# Patient Record
Sex: FEMALE | Race: WHITE | NOT HISPANIC OR LATINO | Employment: FULL TIME | URBAN - METROPOLITAN AREA
[De-identification: names, ages, dates, MRNs, and addresses within clinical notes are randomized per-mention and may not be internally consistent; named-entity substitution may affect disease eponyms.]

---

## 2019-06-25 ENCOUNTER — TELEPHONE (OUTPATIENT)
Dept: BARIATRICS | Facility: CLINIC | Age: 29
End: 2019-06-25

## 2019-06-25 NOTE — TELEPHONE ENCOUNTER
----- Message from Adilene Gutierres sent at 6/25/2019  4:45 PM CDT -----  Contact: [facility]  Appointment Request From: Chiqui Macias    With Provider: Brisa Brar    Preferred Date Range: Any date 6/25/2019 or later    Preferred Times: Any time    Reason for visit: Existing Patient    Comments:  Pregnant

## 2019-06-26 ENCOUNTER — TELEPHONE (OUTPATIENT)
Dept: OBSTETRICS AND GYNECOLOGY | Facility: CLINIC | Age: 29
End: 2019-06-26

## 2019-06-26 NOTE — TELEPHONE ENCOUNTER
----- Message from Dee Dee Sorenson sent at 6/26/2019  1:46 PM CDT -----  Contact: pt   Pt requesting a call back to schedule appointment.Please leave a message if unable to speak with her.Pt is over 20 weeks and requesting to see Ms. Belia Givens call back at [phone].      Thanks,  Dee Dee Sorenson

## 2019-06-26 NOTE — TELEPHONE ENCOUNTER
Spoke to patient and scheduled her appointment for 07/02/19 at 10:00am to see CAMMIE Celaya at the [place] location. Patient verbalized understanding.

## 2019-10-26 ENCOUNTER — HOSPITAL ENCOUNTER (INPATIENT)
Facility: HOSPITAL | Age: 29
LOS: 3 days | Discharge: HOME | End: 2019-10-29
Attending: OBSTETRICS & GYNECOLOGY | Admitting: OBSTETRICS & GYNECOLOGY
Payer: MEDICAID

## 2019-10-26 DIAGNOSIS — O34.219 VBAC, DELIVERED: ICD-10-CM

## 2019-10-26 DIAGNOSIS — O34.219 PREVIOUS CESAREAN DELIVERY AFFECTING PREGNANCY: ICD-10-CM

## 2019-10-26 LAB
ABO + RH BLD: NORMAL
ABO + RH BLD: NORMAL
ALBUMIN SERPL BCP-MCNC: 2.5 G/DL (ref 3.5–5.2)
ALP SERPL-CCNC: 180 U/L (ref 55–135)
ALT SERPL W/O P-5'-P-CCNC: 10 U/L (ref 10–44)
AMPHET+METHAMPHET UR QL: NORMAL
ANION GAP SERPL CALC-SCNC: 11 MMOL/L (ref 8–16)
AST SERPL-CCNC: 21 U/L (ref 10–40)
BARBITURATES UR QL SCN>200 NG/ML: NEGATIVE
BASOPHILS # BLD AUTO: 0.04 K/UL (ref 0–0.2)
BASOPHILS NFR BLD: 0.3 % (ref 0–1.9)
BENZODIAZ UR QL SCN>200 NG/ML: NEGATIVE
BILIRUB SERPL-MCNC: 0.3 MG/DL (ref 0.1–1)
BLD GP AB SCN CELLS X3 SERPL QL: NORMAL
BUN SERPL-MCNC: 14 MG/DL (ref 6–20)
BZE UR QL SCN: NEGATIVE
CALCIUM SERPL-MCNC: 8.8 MG/DL (ref 8.7–10.5)
CANNABINOIDS UR QL SCN: NEGATIVE
CHLORIDE SERPL-SCNC: 108 MMOL/L (ref 95–110)
CO2 SERPL-SCNC: 17 MMOL/L (ref 23–29)
CREAT SERPL-MCNC: 0.7 MG/DL (ref 0.5–1.4)
CREAT UR-MCNC: 224.7 MG/DL (ref 15–325)
CREAT UR-MCNC: 224.7 MG/DL (ref 15–325)
DIFFERENTIAL METHOD: ABNORMAL
EOSINOPHIL # BLD AUTO: 0.1 K/UL (ref 0–0.5)
EOSINOPHIL NFR BLD: 0.7 % (ref 0–8)
ERYTHROCYTE [DISTWIDTH] IN BLOOD BY AUTOMATED COUNT: 13.4 % (ref 11.5–14.5)
EST. GFR  (AFRICAN AMERICAN): >60 ML/MIN/1.73 M^2
EST. GFR  (NON AFRICAN AMERICAN): >60 ML/MIN/1.73 M^2
FETAL CELL SCN BLD QL ROSETTE: NORMAL
GLUCOSE SERPL-MCNC: 84 MG/DL (ref 70–110)
HCT VFR BLD AUTO: 42.5 % (ref 37–48.5)
HGB BLD-MCNC: 13.9 G/DL (ref 12–16)
HIV1+2 IGG SERPL QL IA.RAPID: NEGATIVE
IMM GRANULOCYTES # BLD AUTO: 0.09 K/UL (ref 0–0.04)
IMM GRANULOCYTES NFR BLD AUTO: 0.6 % (ref 0–0.5)
LYMPHOCYTES # BLD AUTO: 3.1 K/UL (ref 1–4.8)
LYMPHOCYTES NFR BLD: 21.5 % (ref 18–48)
MCH RBC QN AUTO: 28.5 PG (ref 27–31)
MCHC RBC AUTO-ENTMCNC: 32.7 G/DL (ref 32–36)
MCV RBC AUTO: 87 FL (ref 82–98)
METHADONE UR QL SCN>300 NG/ML: NEGATIVE
MONOCYTES # BLD AUTO: 0.8 K/UL (ref 0.3–1)
MONOCYTES NFR BLD: 5.6 % (ref 4–15)
NEUTROPHILS # BLD AUTO: 10.2 K/UL (ref 1.8–7.7)
NEUTROPHILS NFR BLD: 71.3 % (ref 38–73)
NRBC BLD-RTO: 0 /100 WBC
OPIATES UR QL SCN: NEGATIVE
PCP UR QL SCN>25 NG/ML: NEGATIVE
PLATELET # BLD AUTO: 224 K/UL (ref 150–350)
PMV BLD AUTO: 10.1 FL (ref 9.2–12.9)
POTASSIUM SERPL-SCNC: 3.9 MMOL/L (ref 3.5–5.1)
PROT SERPL-MCNC: 6.4 G/DL (ref 6–8.4)
PROT UR-MCNC: 66 MG/DL (ref 0–15)
PROT/CREAT UR: 0.29 MG/G{CREAT} (ref 0–0.2)
RBC # BLD AUTO: 4.87 M/UL (ref 4–5.4)
RPR SER QL: NORMAL
SODIUM SERPL-SCNC: 136 MMOL/L (ref 136–145)
TOXICOLOGY INFORMATION: NORMAL
WBC # BLD AUTO: 14.36 K/UL (ref 3.9–12.7)

## 2019-10-26 PROCEDURE — 63600175 PHARM REV CODE 636 W HCPCS: Performed by: OBSTETRICS & GYNECOLOGY

## 2019-10-26 PROCEDURE — 85461 HEMOGLOBIN FETAL: CPT

## 2019-10-26 PROCEDURE — 86707 HEPATITIS BE ANTIBODY: CPT

## 2019-10-26 PROCEDURE — 86850 RBC ANTIBODY SCREEN: CPT

## 2019-10-26 PROCEDURE — 86592 SYPHILIS TEST NON-TREP QUAL: CPT

## 2019-10-26 PROCEDURE — 99900059 HC C-SECTION ATTEND (STAT)

## 2019-10-26 PROCEDURE — 85025 COMPLETE CBC W/AUTO DIFF WBC: CPT

## 2019-10-26 PROCEDURE — 25000003 PHARM REV CODE 250: Performed by: MIDWIFE

## 2019-10-26 PROCEDURE — 25000003 PHARM REV CODE 250: Performed by: OBSTETRICS & GYNECOLOGY

## 2019-10-26 PROCEDURE — 86762 RUBELLA ANTIBODY: CPT

## 2019-10-26 PROCEDURE — 86901 BLOOD TYPING SEROLOGIC RH(D): CPT | Mod: 91

## 2019-10-26 PROCEDURE — 80307 DRUG TEST PRSMV CHEM ANLYZR: CPT

## 2019-10-26 PROCEDURE — 72200004 HC VAGINAL DELIVERY LEVEL I

## 2019-10-26 PROCEDURE — 84156 ASSAY OF PROTEIN URINE: CPT

## 2019-10-26 PROCEDURE — 80053 COMPREHEN METABOLIC PANEL: CPT

## 2019-10-26 PROCEDURE — 59612 PR VAG DELIV ONLY,PREV C-SECTN: ICD-10-PCS | Mod: GZ,,, | Performed by: MIDWIFE

## 2019-10-26 PROCEDURE — 51701 INSERT BLADDER CATHETER: CPT

## 2019-10-26 PROCEDURE — 86703 HIV-1/HIV-2 1 RESULT ANTBDY: CPT

## 2019-10-26 PROCEDURE — 11000001 HC ACUTE MED/SURG PRIVATE ROOM

## 2019-10-26 RX ORDER — LABETALOL HYDROCHLORIDE 5 MG/ML
20 INJECTION, SOLUTION INTRAVENOUS ONCE AS NEEDED
Status: COMPLETED | OUTPATIENT
Start: 2019-10-26 | End: 2019-10-26

## 2019-10-26 RX ORDER — LABETALOL HYDROCHLORIDE 5 MG/ML
40 INJECTION, SOLUTION INTRAVENOUS ONCE AS NEEDED
Status: COMPLETED | OUTPATIENT
Start: 2019-10-26 | End: 2019-10-26

## 2019-10-26 RX ORDER — OXYTOCIN/RINGER'S LACTATE 30/500 ML
334 PLASTIC BAG, INJECTION (ML) INTRAVENOUS CONTINUOUS
Status: DISCONTINUED | OUTPATIENT
Start: 2019-10-26 | End: 2019-10-29 | Stop reason: HOSPADM

## 2019-10-26 RX ORDER — DIPHENOXYLATE HYDROCHLORIDE AND ATROPINE SULFATE 2.5; .025 MG/1; MG/1
1 TABLET ORAL ONCE
Status: COMPLETED | OUTPATIENT
Start: 2019-10-26 | End: 2019-10-26

## 2019-10-26 RX ORDER — SODIUM CHLORIDE, SODIUM LACTATE, POTASSIUM CHLORIDE, CALCIUM CHLORIDE 600; 310; 30; 20 MG/100ML; MG/100ML; MG/100ML; MG/100ML
INJECTION, SOLUTION INTRAVENOUS CONTINUOUS
Status: DISCONTINUED | OUTPATIENT
Start: 2019-10-26 | End: 2019-10-26

## 2019-10-26 RX ORDER — DIPHENHYDRAMINE HYDROCHLORIDE 50 MG/ML
25 INJECTION INTRAMUSCULAR; INTRAVENOUS EVERY 4 HOURS PRN
Status: DISCONTINUED | OUTPATIENT
Start: 2019-10-26 | End: 2019-10-29 | Stop reason: HOSPADM

## 2019-10-26 RX ORDER — SODIUM CHLORIDE, SODIUM LACTATE, POTASSIUM CHLORIDE, CALCIUM CHLORIDE 600; 310; 30; 20 MG/100ML; MG/100ML; MG/100ML; MG/100ML
INJECTION, SOLUTION INTRAVENOUS CONTINUOUS
Status: DISCONTINUED | OUTPATIENT
Start: 2019-10-26 | End: 2019-10-29 | Stop reason: HOSPADM

## 2019-10-26 RX ORDER — OXYTOCIN/RINGER'S LACTATE 30/500 ML
334 PLASTIC BAG, INJECTION (ML) INTRAVENOUS ONCE
Status: DISCONTINUED | OUTPATIENT
Start: 2019-10-26 | End: 2019-10-26

## 2019-10-26 RX ORDER — DOCUSATE SODIUM 100 MG/1
200 CAPSULE, LIQUID FILLED ORAL 2 TIMES DAILY PRN
Status: DISCONTINUED | OUTPATIENT
Start: 2019-10-26 | End: 2019-10-29 | Stop reason: HOSPADM

## 2019-10-26 RX ORDER — CALCIUM GLUCONATE 98 MG/ML
1 INJECTION, SOLUTION INTRAVENOUS
Status: DISCONTINUED | OUTPATIENT
Start: 2019-10-26 | End: 2019-10-29 | Stop reason: HOSPADM

## 2019-10-26 RX ORDER — HYDRALAZINE HYDROCHLORIDE 20 MG/ML
10 INJECTION INTRAMUSCULAR; INTRAVENOUS ONCE AS NEEDED
Status: DISCONTINUED | OUTPATIENT
Start: 2019-10-26 | End: 2019-10-29 | Stop reason: HOSPADM

## 2019-10-26 RX ORDER — CEFAZOLIN SODIUM 2 G/50ML
2 SOLUTION INTRAVENOUS ONCE AS NEEDED
Status: DISCONTINUED | OUTPATIENT
Start: 2019-10-26 | End: 2019-10-26

## 2019-10-26 RX ORDER — CHLORHEXIDINE GLUCONATE ORAL RINSE 1.2 MG/ML
10 SOLUTION DENTAL
Status: CANCELLED | OUTPATIENT
Start: 2019-10-26

## 2019-10-26 RX ORDER — MISOPROSTOL 200 UG/1
800 TABLET ORAL
Status: DISCONTINUED | OUTPATIENT
Start: 2019-10-26 | End: 2019-10-26

## 2019-10-26 RX ORDER — MAGNESIUM SULFATE HEPTAHYDRATE 40 MG/ML
2 INJECTION, SOLUTION INTRAVENOUS CONTINUOUS
Status: DISCONTINUED | OUTPATIENT
Start: 2019-10-26 | End: 2019-10-29 | Stop reason: HOSPADM

## 2019-10-26 RX ORDER — ACETAMINOPHEN 325 MG/1
650 TABLET ORAL EVERY 6 HOURS PRN
Status: DISCONTINUED | OUTPATIENT
Start: 2019-10-26 | End: 2019-10-29 | Stop reason: HOSPADM

## 2019-10-26 RX ORDER — LABETALOL HYDROCHLORIDE 5 MG/ML
80 INJECTION, SOLUTION INTRAVENOUS ONCE AS NEEDED
Status: DISCONTINUED | OUTPATIENT
Start: 2019-10-26 | End: 2019-10-29 | Stop reason: HOSPADM

## 2019-10-26 RX ORDER — HYDROCODONE BITARTRATE AND ACETAMINOPHEN 5; 325 MG/1; MG/1
1 TABLET ORAL EVERY 4 HOURS PRN
Status: DISCONTINUED | OUTPATIENT
Start: 2019-10-26 | End: 2019-10-29 | Stop reason: HOSPADM

## 2019-10-26 RX ORDER — IBUPROFEN 600 MG/1
600 TABLET ORAL EVERY 6 HOURS
Status: DISCONTINUED | OUTPATIENT
Start: 2019-10-26 | End: 2019-10-26

## 2019-10-26 RX ORDER — SODIUM CITRATE AND CITRIC ACID MONOHYDRATE 334; 500 MG/5ML; MG/5ML
30 SOLUTION ORAL
Status: DISCONTINUED | OUTPATIENT
Start: 2019-10-26 | End: 2019-10-26

## 2019-10-26 RX ORDER — OXYTOCIN/RINGER'S LACTATE 30/500 ML
95 PLASTIC BAG, INJECTION (ML) INTRAVENOUS ONCE
Status: DISCONTINUED | OUTPATIENT
Start: 2019-10-26 | End: 2019-10-26

## 2019-10-26 RX ORDER — MAGNESIUM SULFATE HEPTAHYDRATE 40 MG/ML
4 INJECTION, SOLUTION INTRAVENOUS ONCE
Status: COMPLETED | OUTPATIENT
Start: 2019-10-26 | End: 2019-10-26

## 2019-10-26 RX ORDER — CARBOPROST TROMETHAMINE 250 UG/ML
250 INJECTION, SOLUTION INTRAMUSCULAR ONCE
Status: COMPLETED | OUTPATIENT
Start: 2019-10-26 | End: 2019-10-26

## 2019-10-26 RX ORDER — ONDANSETRON 8 MG/1
8 TABLET, ORALLY DISINTEGRATING ORAL EVERY 8 HOURS PRN
Status: DISCONTINUED | OUTPATIENT
Start: 2019-10-26 | End: 2019-10-29 | Stop reason: HOSPADM

## 2019-10-26 RX ORDER — DIPHENHYDRAMINE HCL 25 MG
25 CAPSULE ORAL EVERY 4 HOURS PRN
Status: DISCONTINUED | OUTPATIENT
Start: 2019-10-26 | End: 2019-10-29 | Stop reason: HOSPADM

## 2019-10-26 RX ORDER — LABETALOL HYDROCHLORIDE 5 MG/ML
40 INJECTION, SOLUTION INTRAVENOUS ONCE AS NEEDED
Status: DISCONTINUED | OUTPATIENT
Start: 2019-10-26 | End: 2019-10-29 | Stop reason: HOSPADM

## 2019-10-26 RX ORDER — OXYTOCIN/RINGER'S LACTATE 30/500 ML
95 PLASTIC BAG, INJECTION (ML) INTRAVENOUS ONCE
Status: DISCONTINUED | OUTPATIENT
Start: 2019-10-26 | End: 2019-10-29 | Stop reason: HOSPADM

## 2019-10-26 RX ADMIN — MAGNESIUM SULFATE HEPTAHYDRATE 2 G/HR: 40 INJECTION, SOLUTION INTRAVENOUS at 09:10

## 2019-10-26 RX ADMIN — LABETALOL HYDROCHLORIDE 20 MG: 5 INJECTION INTRAVENOUS at 06:10

## 2019-10-26 RX ADMIN — HYDROCODONE BITARTRATE AND ACETAMINOPHEN 1 TABLET: 5; 325 TABLET ORAL at 04:10

## 2019-10-26 RX ADMIN — LABETALOL HYDROCHLORIDE 20 MG: 5 INJECTION INTRAVENOUS at 05:10

## 2019-10-26 RX ADMIN — CARBOPROST TROMETHAMINE 250 MCG: 250 INJECTION, SOLUTION INTRAMUSCULAR at 04:10

## 2019-10-26 RX ADMIN — SODIUM CHLORIDE, SODIUM LACTATE, POTASSIUM CHLORIDE, AND CALCIUM CHLORIDE: .6; .31; .03; .02 INJECTION, SOLUTION INTRAVENOUS at 08:10

## 2019-10-26 RX ADMIN — LABETALOL HYDROCHLORIDE 20 MG: 5 INJECTION INTRAVENOUS at 07:10

## 2019-10-26 RX ADMIN — Medication 334 MILLI-UNITS/MIN: at 04:10

## 2019-10-26 RX ADMIN — DIPHENOXYLATE HYDROCHLORIDE AND ATROPINE SULFATE 1 TABLET: 2.5; .025 TABLET ORAL at 04:10

## 2019-10-26 RX ADMIN — IBUPROFEN 600 MG: 600 TABLET, FILM COATED ORAL at 03:10

## 2019-10-26 RX ADMIN — MAGNESIUM SULFATE HEPTAHYDRATE 4 G: 40 INJECTION, SOLUTION INTRAVENOUS at 08:10

## 2019-10-26 NOTE — PROGRESS NOTES
4:03 PM called to evaluate recent excess bleeding with clots. Total EBL since (and including) delivery, was 400mL. Discussed manual exam w pt - she agreed. With this exam the c/s scar was palpable and noted to be intact. Lower uterine segment noted to be atonic. No retained products were appreciated. Pt was able to tolerate the exam. With massage, the uterus responded with good tonicity. Several small clots about  2x5mm size were removed with about 30cc. No active bleeding noted to follow. Made plan to administer additional bag of pitocin, and hemabate. (will abstain from methergine as blood pressure were elevated, however improved now at 150s/70s, PCR nl, No headache, vision change, or right upper quadrant pain).

## 2019-10-26 NOTE — PROGRESS NOTES
Pt called out to report elevated BP; pt was sitting up in bed; BP cuff readjusted and BP retaken; still elevated.  Labetalol given per protocol.

## 2019-10-26 NOTE — SUBJECTIVE & OBJECTIVE
Obstetric HPI:  Patient reports contractions since 0930 am, no prenatal care, reports LMP 19 gives CHANDAN 10/26/19. 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 Reports history of hypothyroidism-not currently taking meds. Reports history of hypertension requiring meds following last pregnancy and not taking medication post discharge from hospital, active fetal movement, No vaginal bleeding , No loss of fluid     This pregnancy has been complicated by no prenatal care, drug use and hypothyroidism    OB History    Para Term  AB Living   5 3 2 1 1 2   SAB TAB Ectopic Multiple Live Births   1 0 0 0 3      # Outcome Date GA Lbr Gume/2nd Weight Sex Delivery Anes PTL Lv   5 Current            4 Term 16 39w3d  3.12 kg (6 lb 14.1 oz) F CS-LTranv Gen, EPI N BETTINA      Name: VINICIO, GIRL JUSTIN      Apgar1: 8  Apgar5: 9   3  10/06/15 36w2d  2.08 kg (4 lb 9.4 oz) F CS-LTranv EPI N ND      Apgar1: 7  Apgar5: 9   2 Term 09 40w0d  3.087 kg (6 lb 12.9 oz) M Vag-Spont EPI N BETTINA   1 SAB 08 4w0d            Past Medical History:   Diagnosis Date    Environmental allergies     Thyroid disease     hypothyroidism     Past Surgical History:   Procedure Laterality Date     SECTION      x 2    TONSILLECTOMY, ADENOIDECTOMY         PTA Medications   Medication Sig    amoxicillin (AMOXIL) 500 MG capsule Take 500 mg by mouth 3 (three) times daily.    ibuprofen (ADVIL,MOTRIN) 800 MG tablet Take 800 mg by mouth 3 (three) times daily.    loratadine (CLARITIN) 10 mg tablet Take 10 mg by mouth once daily.       Review of patient's allergies indicates:  No Known Allergies     Family History     None        Tobacco Use    Smoking status: Current Every Day Smoker     Packs/day: 0.50     Years: 10.00     Pack years: 5.00     Types: Cigarettes   Substance and Sexual Activity    Alcohol use: No     Alcohol/week: 0.0 standard drinks    Drug use: Yes     Frequency: 3.0 times per week      Types: Marijuana     Comment: stopped 2weeks ago     Sexual activity: Yes     Partners: Male     Birth control/protection: Condom     Comment: mut monog     Review of Systems   Constitutional: Negative for activity change, appetite change and fever.   Eyes: Negative for visual disturbance.   Respiratory: Negative for cough.    Cardiovascular: Negative for chest pain.   Gastrointestinal: Negative for nausea and vomiting.   Musculoskeletal: Negative for back pain.   Neurological: Negative for vertigo, syncope and headaches.   Psychiatric/Behavioral: Negative for depression. The patient is not nervous/anxious.       Objective:     Vital Signs (Most Recent):    Vital Signs (24h Range):  BP: ()/()   Arterial Line BP: ()/()         There is no height or weight on file to calculate BMI.    FHT: 140Cat 1 (reassuring)  TOCO:  Q 2-3 minutes    Physical Exam:   Constitutional: She is oriented to person, place, and time. She appears well-developed and well-nourished.    HENT:   Head: Normocephalic and atraumatic.    Eyes: EOM are normal.    Neck: Normal range of motion. Neck supple.    Cardiovascular: Normal rate, regular rhythm, normal heart sounds and intact distal pulses.     Pulmonary/Chest: Effort normal and breath sounds normal.        Abdominal: Soft. Bowel sounds are normal.   gravid             Musculoskeletal: Normal range of motion and moves all extremeties.       Neurological: She is alert and oriented to person, place, and time. She has normal reflexes.    Skin: Skin is warm and dry.    Psychiatric: She has a normal mood and affect. Her behavior is normal. Judgment and thought content normal.       Cervix:  Dilation:  10  Effacement:  100%  Station: 2  Presentation: Vertex     Significant Labs:  Lab Results   Component Value Date    GROUPTRH O NEG 11/01/2016    HEPBSAG Negative 05/06/2016    STREPBCULT No Group B Streptococcus isolated 10/06/2016       I have personallly reviewed all pertinent lab results from the  last 24 hours.

## 2019-10-26 NOTE — PROGRESS NOTES
10/26/2019 5:25 PM   Vaginal bleeding scant. Fundus firm. Still no preeclampsia signs or symptoms, and examination normal with DTRs 1+ all extremities, no clonus,  but Blood pressure is now 180/100. Labetalol protocol ordered.

## 2019-10-26 NOTE — PROGRESS NOTES
CNM at bedside to assess bleeding; manual vag exam preformed; several clots expressed. CNM will call MD for further evaluation.

## 2019-10-26 NOTE — HOSPITAL COURSE
LMP 19- 40w, presents in labor, no PNC  Previous c/s x 2, 1st del- vaginal, 2nd and 3rd c/s  SVE 8cm, Dr Martinez called  Preparing for   1205- screaming she needs to push, SVE complete with head , SROM thick meconium, NICU called  1209 Vaginal delivery   1239- Cytotec given po, Dr Martinez called   1249 Placenta delivered  4:03 PM called to evaluate recent excess bleeding with clots. Total EBL since (and including) delivery, was 400mL. Discussed manual exam w pt - she agreed. With this exam the c/s scar was palpable and noted to be intact. Lower uterine segment noted to be atonic. No retained products were appreciated. Pt was able to tolerate the exam. With massage, the uterus responded with good tonicity. Several small clots about  2x5mm size were removed with about 30cc. No active bleeding noted to follow. Made plan to administer additional bag of pitocin, and hemabate. (will abstain from methergine as blood pressure were elevated, however improved now at 150s/70s, PCR nl, No headache, vision change, or right upper quadrant pain).     10/26/2019 5:25 PM   Vaginal bleeding scant. Fundus firm. Still no preeclampsia signs or symptoms, and examination normal with DTRs 1+ all extremities, no clonus,  but Blood pressure is now 180/100. Labetalol protocol ordered.     10/26/2019 6:04 PM   Repeat blood pressures 150/80s after 20mg IV labetalol.   no complaints. Blood pressures suspected to be reaction to amphetamines, but will keep low threshold for preeclampsia seizure prophylaxis.     10/27/2019 1:21 PM   Patient doing well on magnesium sulphate prophylaxis. She has required another  dose IV labetatlol, therefore started oral regimen of 200mg BID labetalol.  No headache, vision change, or right upper quadrant pain.   10/28/19 0830 on labetalol 200 mg bid, doing well  10/29/19 ppd3 Discharge home today. Reviewed PIH s/s, PPD, bleeding precautions, pain management. Follow up in 1 week for bp  check. 4 weeks postpartum visit. Continue labetalol 200mg BID. Denies neuro symptoms

## 2019-10-26 NOTE — PROGRESS NOTES
Delivery imminent; pt pushing; MD notified but not in house yet; okay to go forward with vag delivery; CNM at bedside.

## 2019-10-26 NOTE — PROGRESS NOTES
MD informed of elevated BP; labetalol order set placed.  MD at bedside for exam; +1 reflexes, denies headache and epigastric pain.

## 2019-10-26 NOTE — H&P
[facility] -   Obstetrics  History & Physical    Patient Name: Chiqui Mooney  MRN: [id number]  Admission Date: 10/26/2019  Primary Care Provider: Primary Doctor No    Subjective:     Principal Problem:Previous  delivery affecting pregnancy    History of Present Illness:   28 yo presents via ambulance in labor    Obstetric HPI:  Patient reports contractions since 0930 am, no prenatal care, reports LMP 19 gives CHANDAN 10/26/19. 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 Reports history of hypothyroidism-not currently taking meds. Reports history of hypertension requiring meds following last pregnancy and not taking medication post discharge from hospital, active fetal movement, No vaginal bleeding , No loss of fluid     This pregnancy has been complicated by no prenatal care, drug use and hypothyroidism    OB History    Para Term  AB Living   5 3 2 1 1 2   SAB TAB Ectopic Multiple Live Births   1 0 0 0 3      # Outcome Date GA Lbr Gume/2nd Weight Sex Delivery Anes PTL Lv   5 Current            4 Term 16 39w3d  3.12 kg (6 lb 14.1 oz) F CS-LTranv Gen, EPI N BETTINA      Name: TJ MOONEY      Apgar1: 8  Apgar5: 9   3  10/06/15 36w2d  2.08 kg (4 lb 9.4 oz) F CS-LTranv EPI N ND      Apgar1: 7  Apgar5: 9   2 Term 09 40w0d  3.087 kg (6 lb 12.9 oz) M Vag-Spont EPI N BETTINA   1 SAB 08 4w0d            Past Medical History:   Diagnosis Date    Environmental allergies     Thyroid disease     hypothyroidism     Past Surgical History:   Procedure Laterality Date     SECTION      x 2    TONSILLECTOMY, ADENOIDECTOMY         PTA Medications   Medication Sig    amoxicillin (AMOXIL) 500 MG capsule Take 500 mg by mouth 3 (three) times daily.    ibuprofen (ADVIL,MOTRIN) 800 MG tablet Take 800 mg by mouth 3 (three) times daily.    loratadine (CLARITIN) 10 mg tablet Take 10 mg by mouth once daily.       Review of patient's allergies  indicates:  No Known Allergies     Family History     None        Tobacco Use    Smoking status: Current Every Day Smoker     Packs/day: 0.50     Years: 10.00     Pack years: 5.00     Types: Cigarettes   Substance and Sexual Activity    Alcohol use: No     Alcohol/week: 0.0 standard drinks    Drug use: Yes     Frequency: 3.0 times per week     Types: Marijuana     Comment: stopped 2weeks ago     Sexual activity: Yes     Partners: Male     Birth control/protection: Condom     Comment: mut monog     Review of Systems   Constitutional: Negative for activity change, appetite change and fever.   Eyes: Negative for visual disturbance.   Respiratory: Negative for cough.    Cardiovascular: Negative for chest pain.   Gastrointestinal: Negative for nausea and vomiting.   Musculoskeletal: Negative for back pain.   Neurological: Negative for vertigo, syncope and headaches.   Psychiatric/Behavioral: Negative for depression. The patient is not nervous/anxious.       Objective:     Vital Signs (Most Recent):    Vital Signs (24h Range):  BP: ()/()   Arterial Line BP: ()/()         There is no height or weight on file to calculate BMI.    FHT: 140Cat 1 (reassuring)  TOCO:  Q 2-3 minutes    Physical Exam:   Constitutional: She is oriented to person, place, and time. She appears well-developed and well-nourished.    HENT:   Head: Normocephalic and atraumatic.    Eyes: EOM are normal.    Neck: Normal range of motion. Neck supple.    Cardiovascular: Normal rate, regular rhythm, normal heart sounds and intact distal pulses.     Pulmonary/Chest: Effort normal and breath sounds normal.        Abdominal: Soft. Bowel sounds are normal.   gravid             Musculoskeletal: Normal range of motion and moves all extremeties.       Neurological: She is alert and oriented to person, place, and time. She has normal reflexes.    Skin: Skin is warm and dry.    Psychiatric: She has a normal mood and affect. Her behavior is normal. Judgment and  thought content normal.       Cervix:  Dilation:  10  Effacement:  100%  Station: 2  Presentation: Vertex     Significant Labs:  Lab Results   Component Value Date    GROUPTRH O NEG 2016    HEPBSAG Negative 2016    STREPBCULT No Group B Streptococcus isolated 10/06/2016       I have personallly reviewed all pertinent lab results from the last 24 hours.    Assessment/Plan:     29 y.o. female  at Unknown for:    * Previous  delivery affecting pregnancy   LMP 19- 40w, presents in labor, no PNC  Previous c/s x 2, 1st del- vaginal, 2nd and 3rd c/s  SVE 8cm, Dr Martinez called  Preparing for   1205- screaming she needs to push, SVE complete with head , SROM thick meconium, NICU called  1209 Vaginal delivery   1239- Cytotec given po, Dr Martinez called   1249 Placenta delivered        Etta Winkler, CAMMIE  Obstetrics  [facility]

## 2019-10-26 NOTE — PROGRESS NOTES
Pt arrived via EMS; CNM at bedside, pt 8 cm.  Previous c/s x2; MD notified by CNM; on her way to hospital.  Will prepare pt for immediate c/s.

## 2019-10-26 NOTE — ASSESSMENT & PLAN NOTE
LMP 19- 40w, presents in labor, no PNC  Previous c/s x 2, 1st del- vaginal, 2nd and 3rd c/s  SVE 8cm, Dr Martinez called  Preparing for   1205- screaming she needs to push, SVE complete with head , SROM thick meconium, NICU called  1209 Vaginal delivery   1239- Cytotec given po, Dr Martinez called   1249 Placenta delivered

## 2019-10-27 PROCEDURE — 25000003 PHARM REV CODE 250: Performed by: OBSTETRICS & GYNECOLOGY

## 2019-10-27 PROCEDURE — 25000003 PHARM REV CODE 250: Performed by: MIDWIFE

## 2019-10-27 PROCEDURE — 25000003 PHARM REV CODE 250: Performed by: ADVANCED PRACTICE MIDWIFE

## 2019-10-27 PROCEDURE — 11000001 HC ACUTE MED/SURG PRIVATE ROOM

## 2019-10-27 PROCEDURE — 63600175 PHARM REV CODE 636 W HCPCS: Performed by: OBSTETRICS & GYNECOLOGY

## 2019-10-27 RX ORDER — LABETALOL 200 MG/1
200 TABLET, FILM COATED ORAL EVERY 12 HOURS
Status: DISCONTINUED | OUTPATIENT
Start: 2019-10-27 | End: 2019-10-29 | Stop reason: HOSPADM

## 2019-10-27 RX ORDER — LABETALOL HYDROCHLORIDE 5 MG/ML
20 INJECTION, SOLUTION INTRAVENOUS ONCE AS NEEDED
Status: DISCONTINUED | OUTPATIENT
Start: 2019-10-27 | End: 2019-10-29 | Stop reason: HOSPADM

## 2019-10-27 RX ORDER — LABETALOL HYDROCHLORIDE 5 MG/ML
20 INJECTION, SOLUTION INTRAVENOUS ONCE AS NEEDED
Status: COMPLETED | OUTPATIENT
Start: 2019-10-27 | End: 2019-10-27

## 2019-10-27 RX ADMIN — LABETALOL HYDROCHLORIDE 200 MG: 200 TABLET, FILM COATED ORAL at 09:10

## 2019-10-27 RX ADMIN — SODIUM CHLORIDE, SODIUM LACTATE, POTASSIUM CHLORIDE, AND CALCIUM CHLORIDE: .6; .31; .03; .02 INJECTION, SOLUTION INTRAVENOUS at 09:10

## 2019-10-27 RX ADMIN — HYDROCODONE BITARTRATE AND ACETAMINOPHEN 1 TABLET: 5; 325 TABLET ORAL at 09:10

## 2019-10-27 RX ADMIN — ACETAMINOPHEN 650 MG: 325 TABLET ORAL at 03:10

## 2019-10-27 RX ADMIN — MAGNESIUM SULFATE HEPTAHYDRATE 2 G/HR: 40 INJECTION, SOLUTION INTRAVENOUS at 02:10

## 2019-10-27 RX ADMIN — ACETAMINOPHEN 650 MG: 325 TABLET ORAL at 09:10

## 2019-10-27 RX ADMIN — LABETALOL HYDROCHLORIDE 20 MG: 5 INJECTION INTRAVENOUS at 05:10

## 2019-10-27 NOTE — ASSESSMENT & PLAN NOTE
HTN management with labetalol 200mg BID  PP seizure prophylaxis with 24hr magnesium sulfate administered.

## 2019-10-27 NOTE — PROGRESS NOTES
[facility] -   Obstetrics  Postpartum Progress Note    Patient Name: Chiqui Macias  MRN: [id number]  Admission Date: 10/26/2019  Hospital Length of Stay: 1 days  Attending Physician: Diana Martinez MD  Primary Care Provider: Primary Doctor No    Subjective:     Principal Problem:, delivered    Hospital course:  LMP 19- 40w, presents in labor, no PNC  Previous c/s x 2, 1st del- vaginal, 2nd and 3rd c/s  SVE 8cm, Dr Martinez called  Preparing for   1205- screaming she needs to push, SVE complete with head , SROM thick meconium, NICU called  1209 Vaginal delivery   1239- Cytotec given po, Dr Martinez called   1249 Placenta delivered  4:03 PM called to evaluate recent excess bleeding with clots. Total EBL since (and including) delivery, was 400mL. Discussed manual exam w pt - she agreed. With this exam the c/s scar was palpable and noted to be intact. Lower uterine segment noted to be atonic. No retained products were appreciated. Pt was able to tolerate the exam. With massage, the uterus responded with good tonicity. Several small clots about  2x5mm size were removed with about 30cc. No active bleeding noted to follow. Made plan to administer additional bag of pitocin, and hemabate. (will abstain from methergine as blood pressure were elevated, however improved now at 150s/70s, PCR nl, No headache, vision change, or right upper quadrant pain).     10/26/2019 5:25 PM   Vaginal bleeding scant. Fundus firm. Still no preeclampsia signs or symptoms, and examination normal with DTRs 1+ all extremities, no clonus,  but Blood pressure is now 180/100. Labetalol protocol ordered.     10/26/2019 6:04 PM   Repeat blood pressures 150/80s after 20mg IV labetalol.   no complaints. Blood pressures suspected to be reaction to amphetamines, but will keep low threshold for preeclampsia seizure prophylaxis.     10/27/2019 1:21 PM   Patient doing well on magnesium sulphate prophylaxis. She  has required another  dose IV labetatlol, therefore started oral regimen of 200mg BID labetalol.  No headache, vision change, or right upper quadrant pain.          Objective:     Vital Signs (Most Recent):  Temp: 97.7 °F (36.5 °C) (10/27/19 0705)  Pulse: 77 (10/27/19 1209)  Resp: 18 (10/27/19 0705)  BP: 125/85 (10/27/19 1204)  SpO2: 97 % (10/27/19 1209) Vital Signs (24h Range):  Temp:  [97.7 °F (36.5 °C)] 97.7 °F (36.5 °C)  Pulse:  [54-99] 77  Resp:  [18] 18  SpO2:  [97 %-100 %] 97 %  BP: (107-192)/() 125/85        There is no height or weight on file to calculate BMI.      Intake/Output Summary (Last 24 hours) at 10/27/2019 1323  Last data filed at 10/27/2019 1200  Gross per 24 hour   Intake 1400 ml   Output 4800 ml   Net -3400 ml       Significant Labs:  Lab Results   Component Value Date    GROUPTRH O NEG 10/26/2019    HEPBSAG Negative 2016    STREPBCULT No Group B Streptococcus isolated 10/06/2016     Recent Labs   Lab 10/26/19  1200   HGB 13.9   HCT 42.5       I have personallly reviewed all pertinent lab results from the last 24 hours.    Physical Exam:   Constitutional: She is oriented to person, place, and time. She appears well-developed and well-nourished.    HENT:   Head: Normocephalic.     Neck: Normal range of motion. No thyromegaly present.     Pulmonary/Chest: Effort normal.        Abdominal: Soft. She exhibits no distension. There is no tenderness (uterine fundus firm below umbilicus and appropriately tender for post delivery).         Genitourinary:   Genitourinary Comments: Lochia within normal           Musculoskeletal: Normal range of motion and moves all extremeties.   No calf tenderness or Kate's sign.       Neurological: She is alert and oriented to person, place, and time. She has normal reflexes.   No clonus    Skin: Skin is warm and dry.    Psychiatric: She has a normal mood and affect.       Assessment/Plan:     29 y.o. female  for:    * , delivered        Hypertension in pregnancy, preeclampsia, delivered  HTN management with labetalol 200mg BID  PP seizure prophylaxis with 24hr magnesium sulfate administered.        Disposition: As patient meets milestones, will plan to discharge  .    Diana Martinez MD  Obstetrics  [facility]

## 2019-10-27 NOTE — SUBJECTIVE & OBJECTIVE
Hospital course:  LMP 19- 40w, presents in labor, no PNC  Previous c/s x 2, 1st del- vaginal, 2nd and 3rd c/s  SVE 8cm, Dr Martinez called  Preparing for   1205- screaming she needs to push, SVE complete with head , SROM thick meconium, NICU called  1209 Vaginal delivery   1239- Cytotec given po, Dr Martinez called   1249 Placenta delivered  4:03 PM called to evaluate recent excess bleeding with clots. Total EBL since (and including) delivery, was 400mL. Discussed manual exam w pt - she agreed. With this exam the c/s scar was palpable and noted to be intact. Lower uterine segment noted to be atonic. No retained products were appreciated. Pt was able to tolerate the exam. With massage, the uterus responded with good tonicity. Several small clots about  2x5mm size were removed with about 30cc. No active bleeding noted to follow. Made plan to administer additional bag of pitocin, and hemabate. (will abstain from methergine as blood pressure were elevated, however improved now at 150s/70s, PCR nl, No headache, vision change, or right upper quadrant pain).     10/26/2019 5:25 PM   Vaginal bleeding scant. Fundus firm. Still no preeclampsia signs or symptoms, and examination normal with DTRs 1+ all extremities, no clonus,  but Blood pressure is now 180/100. Labetalol protocol ordered.     10/26/2019 6:04 PM   Repeat blood pressures 150/80s after 20mg IV labetalol.   no complaints. Blood pressures suspected to be reaction to amphetamines, but will keep low threshold for preeclampsia seizure prophylaxis.     10/27/2019 1:21 PM   Patient doing well on magnesium sulphate prophylaxis. She has required another  dose IV labetatlol, therefore started oral regimen of 200mg BID labetalol.  No headache, vision change, or right upper quadrant pain.          Objective:     Vital Signs (Most Recent):  Temp: 97.7 °F (36.5 °C) (10/27/19 0705)  Pulse: 77 (10/27/19 1209)  Resp: 18 (10/27/19 0705)  BP: 125/85 (10/27/19  1204)  SpO2: 97 % (10/27/19 1209) Vital Signs (24h Range):  Temp:  [97.7 °F (36.5 °C)] 97.7 °F (36.5 °C)  Pulse:  [54-99] 77  Resp:  [18] 18  SpO2:  [97 %-100 %] 97 %  BP: (107-192)/() 125/85        There is no height or weight on file to calculate BMI.      Intake/Output Summary (Last 24 hours) at 10/27/2019 1323  Last data filed at 10/27/2019 1200  Gross per 24 hour   Intake 1400 ml   Output 4800 ml   Net -3400 ml       Significant Labs:  Lab Results   Component Value Date    GROUPTRH O NEG 10/26/2019    HEPBSAG Negative 05/06/2016    STREPBCULT No Group B Streptococcus isolated 10/06/2016     Recent Labs   Lab 10/26/19  1200   HGB 13.9   HCT 42.5       I have personallly reviewed all pertinent lab results from the last 24 hours.    Physical Exam:   Constitutional: She is oriented to person, place, and time. She appears well-developed and well-nourished.    HENT:   Head: Normocephalic.     Neck: Normal range of motion. No thyromegaly present.     Pulmonary/Chest: Effort normal.        Abdominal: Soft. She exhibits no distension. There is no tenderness (uterine fundus firm below umbilicus and appropriately tender for post delivery).         Genitourinary:   Genitourinary Comments: Lochia within normal           Musculoskeletal: Normal range of motion and moves all extremeties.   No calf tenderness or Kate's sign.       Neurological: She is alert and oriented to person, place, and time. She has normal reflexes.   No clonus    Skin: Skin is warm and dry.    Psychiatric: She has a normal mood and affect.

## 2019-10-27 NOTE — L&D DELIVERY NOTE
[facility] -   Vaginal Delivery   Operative Note    SUMMARY     Normal spontaneous vaginal delivery of live infant, skin to skin was unable to be performed due to meconium.  Infant delivered position OA over intact perineum.  Nuchal cord: Yes, cord reduced following delivery.    Spontaneous delivery of placenta and IV pitocin given noting good uterine tone.  No lacerations noted.  Patient tolerated delivery well. Sponge needle and lap counted correctly x2.    Indications: , delivered  Pregnancy complicated by:   Patient Active Problem List   Diagnosis    Tobacco smoking affecting pregnancy    Rh negative status during pregnancy in third trimester    Unexpected sudden death of infant    , delivered    Single live birth     Admitting GA: Unknown    Delivery Information for Lilli Macias    Birth information:  YOB: 2021   Time of birth: 12:09 PM   Sex: female   Head Delivery Date/Time: 10/26/2019 12:09 PM   Delivery type: , Spontaneous   Gestational Age: <None>    Delivery Providers    Delivering clinician:  Etta Winkler CNM   Provider Role    Manda Chang RN Registered Nurse    Milly Bowie RN Registered Nurse    BECKIE Grullon Nurse Practitioner    Briana Norris RN Registered Nurse            Measurements    Weight:  2925 g  Length:  49 cm  Head circumference:  33.5 cm  Chest circumference:  31.5 cm  Abdominal girth:  31 cm         Apgars    Living status:  Living  Apgars:   1 min.:   5 min.:   10 min.:   15 min.:   20 min.:     Skin color:   0  1       Heart rate:   2  2       Reflex irritability:   2  2       Muscle tone:   2  2       Respiratory effort:   2  2       Total:   8  9       Apgars assigned by:  BECKIE VERDE         Operative Delivery    Forceps attempted?:  No  Vacuum extractor attempted?:  No         Shoulder Dystocia    Shoulder dystocia present?:  No           Presentation    Presentation:  Vertex  Position:  Middle  Occiput Anterior           Interventions/Resuscitation    Method:  Bulb Suctioning, Tactile Stimulation, Deep Suctioning, Blow By Oxygen, CPAP, NICU Attended       Cord    Vessels:  3 vessels  Complications:  None  Delayed Cord Clamping?:  No  Cord Clamped Date/Time:  10/26/2019 12:09 PM  Cord Blood Disposition:  Lab  Gases Sent?:  No  Stem Cell Collection (by MD):  No       Placenta    Placenta delivery date/time:  10/26/2019 1249  Placenta removal:  Spontaneous  Placenta appearance:  Intact  Placenta disposition:  discarded           Labor Events:       labor: No     Labor Onset Date/Time:         Dilation Complete Date/Time:         Start Pushing Date/Time:       Rupture Date/Time:              Rupture type:           Fluid Amount:        Fluid Color:        Fluid Odor:        Membrane Status (PeriCalm): INT (Intact)      Rupture Date/Time (PeriCalm):        Fluid Amount (PeriCalm):        Fluid Color (PeriCalm):         steroids: None     Antibiotics given for GBS: No     Induction: none     Indications for induction:        Augmentation:       Indications for augmentation:       Labor complications: None     Additional complications:          Cervical ripening:                     Delivery:      Episiotomy: None     Indication for Episiotomy:       Perineal Lacerations: None Repaired:      Periurethral Laceration:   Repaired:     Labial Laceration:   Repaired:     Sulcus Laceration:   Repaired:     Vaginal Laceration:   Repaired:     Cervical Laceration:   Repaired:     Repair suture: None     Repair # of packets:       Last Value - EBL - Nursing (mL): 150     Sum - EBL - Nursing (mL): 150     Last Value - EBL - Anesthesia (mL):      Calculated QBL (mL):        Vaginal Sweep Performed: No     Surgicount Correct:         Other providers:       Anesthesia    Method:  None          Details (if applicable):  Trial of Labor      Categorization:      Priority:      Indications for :     Incision Type:       Additional  information:  Forceps:    Vacuum:    Breech:    Observed anomalies    Other (Comments): Meconium

## 2019-10-28 PROCEDURE — 99231 PR SUBSEQUENT HOSPITAL CARE,LEVL I: ICD-10-PCS | Mod: ,,, | Performed by: MIDWIFE

## 2019-10-28 PROCEDURE — 99231 SBSQ HOSP IP/OBS SF/LOW 25: CPT | Mod: ,,, | Performed by: MIDWIFE

## 2019-10-28 PROCEDURE — 25000003 PHARM REV CODE 250: Performed by: MIDWIFE

## 2019-10-28 PROCEDURE — 25000003 PHARM REV CODE 250: Performed by: ADVANCED PRACTICE MIDWIFE

## 2019-10-28 PROCEDURE — 11000001 HC ACUTE MED/SURG PRIVATE ROOM

## 2019-10-28 RX ADMIN — HYDROCODONE BITARTRATE AND ACETAMINOPHEN 1 TABLET: 5; 325 TABLET ORAL at 08:10

## 2019-10-28 RX ADMIN — LABETALOL HYDROCHLORIDE 200 MG: 200 TABLET, FILM COATED ORAL at 09:10

## 2019-10-28 RX ADMIN — HYDROCODONE BITARTRATE AND ACETAMINOPHEN 1 TABLET: 5; 325 TABLET ORAL at 03:10

## 2019-10-28 RX ADMIN — LABETALOL HYDROCHLORIDE 200 MG: 200 TABLET, FILM COATED ORAL at 08:10

## 2019-10-28 NOTE — PROGRESS NOTES
[facility] -   Obstetrics  Labor Progress Note    Patient Name: Chiqui Macias  MRN: [id number]  Admission Date: 10/26/2019  Hospital Length of Stay: 2 days  Attending Physician: Diana Martinez MD  Primary Care Provider: Primary Doctor No    Subjective:     Principal Problem:, delivered    Hospital Course:   LMP 19- 40w, presents in labor, no PNC  Previous c/s x 2, 1st del- vaginal, 2nd and 3rd c/s  SVE 8cm, Dr Martinez called  Preparing for   1205- screaming she needs to push, SVE complete with head , SROM thick meconium, NICU called  1209 Vaginal delivery   1239- Cytotec given po, Dr Martinez called   1249 Placenta delivered  4:03 PM called to evaluate recent excess bleeding with clots. Total EBL since (and including) delivery, was 400mL. Discussed manual exam w pt - she agreed. With this exam the c/s scar was palpable and noted to be intact. Lower uterine segment noted to be atonic. No retained products were appreciated. Pt was able to tolerate the exam. With massage, the uterus responded with good tonicity. Several small clots about  2x5mm size were removed with about 30cc. No active bleeding noted to follow. Made plan to administer additional bag of pitocin, and hemabate. (will abstain from methergine as blood pressure were elevated, however improved now at 150s/70s, PCR nl, No headache, vision change, or right upper quadrant pain).     10/26/2019 5:25 PM   Vaginal bleeding scant. Fundus firm. Still no preeclampsia signs or symptoms, and examination normal with DTRs 1+ all extremities, no clonus,  but Blood pressure is now 180/100. Labetalol protocol ordered.     10/26/2019 6:04 PM   Repeat blood pressures 150/80s after 20mg IV labetalol.   no complaints. Blood pressures suspected to be reaction to amphetamines, but will keep low threshold for preeclampsia seizure prophylaxis.     10/27/2019 1:21 PM   Patient doing well on magnesium sulphate prophylaxis. She has  required another  dose IV labetatlol, therefore started oral regimen of 200mg BID labetalol.  No headache, vision change, or right upper quadrant pain.   10/28/19 0830 on labetalol 200 mg bid, doing well    Hospital course:  LMP 19- 40w, presents in labor, no PNC  Previous c/s x 2, 1st del- vaginal, 2nd and 3rd c/s  SVE 8cm, Dr Martinez called  Preparing for   1205- screaming she needs to push, SVE complete with head , SROM thick meconium, NICU called  1209 Vaginal delivery   1239- Cytotec given po, Dr Martinez called   1249 Placenta delivered  4:03 PM called to evaluate recent excess bleeding with clots. Total EBL since (and including) delivery, was 400mL. Discussed manual exam w pt - she agreed. With this exam the c/s scar was palpable and noted to be intact. Lower uterine segment noted to be atonic. No retained products were appreciated. Pt was able to tolerate the exam. With massage, the uterus responded with good tonicity. Several small clots about  2x5mm size were removed with about 30cc. No active bleeding noted to follow. Made plan to administer additional bag of pitocin, and hemabate. (will abstain from methergine as blood pressure were elevated, however improved now at 150s/70s, PCR nl, No headache, vision change, or right upper quadrant pain).     10/26/2019 5:25 PM   Vaginal bleeding scant. Fundus firm. Still no preeclampsia signs or symptoms, and examination normal with DTRs 1+ all extremities, no clonus,  but Blood pressure is now 180/100. Labetalol protocol ordered.     10/26/2019 6:04 PM   Repeat blood pressures 150/80s after 20mg IV labetalol.   no complaints. Blood pressures suspected to be reaction to amphetamines, but will keep low threshold for preeclampsia seizure prophylaxis.     10/27/2019 1:21 PM   Patient doing well on magnesium sulphate prophylaxis. She has required another  dose IV labetatlol, therefore started oral regimen of 200mg BID labetalol.  No headache,  vision change, or right upper quadrant pain.   10/28/19 0830 on labetalol 200 mg bid, doing well    Interval History:     She is doing well this morning. She is tolerating a regular diet without nausea or vomiting. She is voiding spontaneously. She is ambulating. She has passed flatus, and has not a BM. Vaginal bleeding is mild. She denies fever or chills. Abdominal pain is mild and controlled with oral medications. She is not breastfeeding. She desires circumcision for her male baby: not applicable.    Objective:     Vital Signs (Most Recent):  Temp: 98.3 °F (36.8 °C) (10/28/19 0404)  Pulse: 64 (10/28/19 0404)  Resp: 18 (10/28/19 0404)  BP: (!) 154/72 (10/28/19 0404)  SpO2: 99 % (10/27/19 2019) Vital Signs (24h Range):  Temp:  [98.3 °F (36.8 °C)] 98.3 °F (36.8 °C)  Pulse:  [52-83] 64  Resp:  [18] 18  SpO2:  [97 %-100 %] 99 %  BP: (121-172)/() 154/72        There is no height or weight on file to calculate BMI.      Intake/Output Summary (Last 24 hours) at 10/28/2019 0840  Last data filed at 10/27/2019 2020  Gross per 24 hour   Intake 225 ml   Output 2600 ml   Net -2375 ml       Significant Labs:  Lab Results   Component Value Date    GROUPTRH O NEG 10/26/2019    HEPBSAG Negative 05/06/2016    STREPBCULT No Group B Streptococcus isolated 10/06/2016     Recent Labs   Lab 10/26/19  1200   HGB 13.9   HCT 42.5       I have personallly reviewed all pertinent lab results from the last 24 hours.    Physical Exam:   Constitutional: She is oriented to person, place, and time. She appears well-developed and well-nourished.    HENT:   Head: Normocephalic and atraumatic.    Eyes: Pupils are equal, round, and reactive to light. EOM are normal.    Neck: Normal range of motion.     Pulmonary/Chest: Effort normal.        Abdominal: Soft.     Genitourinary:   Genitourinary Comments: Reports sml rubra           Musculoskeletal: Normal range of motion and moves all extremeties.       Neurological: She is alert and oriented to  person, place, and time. She has normal reflexes.    Skin: Skin is warm and dry.    Psychiatric: She has a normal mood and affect. Her behavior is normal. Judgment and thought content normal.       Assessment/Plan:     29 y.o. female  at Unknown for:    * , delivered  10/28/19 PPD #2, routine pp care     Hypertension in pregnancy, preeclampsia, delivered  HTN management with labetalol 200mg BID  PP seizure prophylaxis with 24hr magnesium sulfate administered.  10/28/19 Labetalol 200 mg bid          Etta Winkler CNM  Obstetrics  [facility] [place]

## 2019-10-28 NOTE — ASSESSMENT & PLAN NOTE
HTN management with labetalol 200mg BID  PP seizure prophylaxis with 24hr magnesium sulfate administered.  10/28/19 Labetalol 200 mg bid

## 2019-10-28 NOTE — SUBJECTIVE & OBJECTIVE
Hospital course:  LMP 19- 40w, presents in labor, no PNC  Previous c/s x 2, 1st del- vaginal, 2nd and 3rd c/s  SVE 8cm, Dr Martinez called  Preparing for   1205- screaming she needs to push, SVE complete with head , SROM thick meconium, NICU called  1209 Vaginal delivery   1239- Cytotec given po, Dr Martinez called   1249 Placenta delivered  4:03 PM called to evaluate recent excess bleeding with clots. Total EBL since (and including) delivery, was 400mL. Discussed manual exam w pt - she agreed. With this exam the c/s scar was palpable and noted to be intact. Lower uterine segment noted to be atonic. No retained products were appreciated. Pt was able to tolerate the exam. With massage, the uterus responded with good tonicity. Several small clots about  2x5mm size were removed with about 30cc. No active bleeding noted to follow. Made plan to administer additional bag of pitocin, and hemabate. (will abstain from methergine as blood pressure were elevated, however improved now at 150s/70s, PCR nl, No headache, vision change, or right upper quadrant pain).     10/26/2019 5:25 PM   Vaginal bleeding scant. Fundus firm. Still no preeclampsia signs or symptoms, and examination normal with DTRs 1+ all extremities, no clonus,  but Blood pressure is now 180/100. Labetalol protocol ordered.     10/26/2019 6:04 PM   Repeat blood pressures 150/80s after 20mg IV labetalol.   no complaints. Blood pressures suspected to be reaction to amphetamines, but will keep low threshold for preeclampsia seizure prophylaxis.     10/27/2019 1:21 PM   Patient doing well on magnesium sulphate prophylaxis. She has required another  dose IV labetatlol, therefore started oral regimen of 200mg BID labetalol.  No headache, vision change, or right upper quadrant pain.   10/28/19 0830 on labetalol 200 mg bid, doing well    Interval History:     She is doing well this morning. She is tolerating a regular diet without nausea or  vomiting. She is voiding spontaneously. She is ambulating. She has passed flatus, and has not a BM. Vaginal bleeding is mild. She denies fever or chills. Abdominal pain is mild and controlled with oral medications. She is not breastfeeding. She desires circumcision for her male baby: not applicable.    Objective:     Vital Signs (Most Recent):  Temp: 98.3 °F (36.8 °C) (10/28/19 0404)  Pulse: 64 (10/28/19 0404)  Resp: 18 (10/28/19 0404)  BP: (!) 154/72 (10/28/19 0404)  SpO2: 99 % (10/27/19 2019) Vital Signs (24h Range):  Temp:  [98.3 °F (36.8 °C)] 98.3 °F (36.8 °C)  Pulse:  [52-83] 64  Resp:  [18] 18  SpO2:  [97 %-100 %] 99 %  BP: (121-172)/() 154/72        There is no height or weight on file to calculate BMI.      Intake/Output Summary (Last 24 hours) at 10/28/2019 0840  Last data filed at 10/27/2019 2020  Gross per 24 hour   Intake 225 ml   Output 2600 ml   Net -2375 ml       Significant Labs:  Lab Results   Component Value Date    GROUPTRH O NEG 10/26/2019    HEPBSAG Negative 05/06/2016    STREPBCULT No Group B Streptococcus isolated 10/06/2016     Recent Labs   Lab 10/26/19  1200   HGB 13.9   HCT 42.5       I have personallly reviewed all pertinent lab results from the last 24 hours.    Physical Exam:   Constitutional: She is oriented to person, place, and time. She appears well-developed and well-nourished.    HENT:   Head: Normocephalic and atraumatic.    Eyes: Pupils are equal, round, and reactive to light. EOM are normal.    Neck: Normal range of motion.     Pulmonary/Chest: Effort normal.        Abdominal: Soft.     Genitourinary:   Genitourinary Comments: Reports sml rubra           Musculoskeletal: Normal range of motion and moves all extremeties.       Neurological: She is alert and oriented to person, place, and time. She has normal reflexes.    Skin: Skin is warm and dry.    Psychiatric: She has a normal mood and affect. Her behavior is normal. Judgment and thought content normal.

## 2019-10-29 VITALS
SYSTOLIC BLOOD PRESSURE: 140 MMHG | OXYGEN SATURATION: 99 % | HEART RATE: 86 BPM | DIASTOLIC BLOOD PRESSURE: 78 MMHG | TEMPERATURE: 98 F | RESPIRATION RATE: 18 BRPM

## 2019-10-29 LAB — HBV E AB SER QL: NORMAL

## 2019-10-29 PROCEDURE — 99238 PR HOSPITAL DISCHARGE DAY,<30 MIN: ICD-10-PCS | Mod: ,,, | Performed by: ADVANCED PRACTICE MIDWIFE

## 2019-10-29 PROCEDURE — 99238 HOSP IP/OBS DSCHRG MGMT 30/<: CPT | Mod: ,,, | Performed by: ADVANCED PRACTICE MIDWIFE

## 2019-10-29 PROCEDURE — 25000003 PHARM REV CODE 250: Performed by: ADVANCED PRACTICE MIDWIFE

## 2019-10-29 RX ADMIN — LABETALOL HYDROCHLORIDE 200 MG: 200 TABLET, FILM COATED ORAL at 08:10

## 2019-10-29 NOTE — DISCHARGE SUMMARY
[facility] -   Obstetrics  Discharge Summary      Patient Name: Chiqui Macias  MRN: [id number]  Admission Date: 10/26/2019  Hospital Length of Stay: 3 days  Discharge Date and Time:  10/29/2019 8:32 AM  Attending Physician: Diana Martinez MD   Discharging Provider: Danial Peterson CNM   Primary Care Provider: Primary Doctor No    HPI:  28 yo presents via ambulance in labor         * No surgery found *     Hospital Course:    LMP 19- 40w, presents in labor, no PNC  Previous c/s x 2, 1st del- vaginal, 2nd and 3rd c/s  SVE 8cm, Dr Martinez called  Preparing for   1205- screaming she needs to push, SVE complete with head , SROM thick meconium, NICU called  1209 Vaginal delivery   1239- Cytotec given po, Dr Martinez called   1249 Placenta delivered  4:03 PM called to evaluate recent excess bleeding with clots. Total EBL since (and including) delivery, was 400mL. Discussed manual exam w pt - she agreed. With this exam the c/s scar was palpable and noted to be intact. Lower uterine segment noted to be atonic. No retained products were appreciated. Pt was able to tolerate the exam. With massage, the uterus responded with good tonicity. Several small clots about  2x5mm size were removed with about 30cc. No active bleeding noted to follow. Made plan to administer additional bag of pitocin, and hemabate. (will abstain from methergine as blood pressure were elevated, however improved now at 150s/70s, PCR nl, No headache, vision change, or right upper quadrant pain).     10/26/2019 5:25 PM   Vaginal bleeding scant. Fundus firm. Still no preeclampsia signs or symptoms, and examination normal with DTRs 1+ all extremities, no clonus,  but Blood pressure is now 180/100. Labetalol protocol ordered.     10/26/2019 6:04 PM   Repeat blood pressures 150/80s after 20mg IV labetalol.   no complaints. Blood pressures suspected to be reaction to amphetamines, but will keep low threshold for  preeclampsia seizure prophylaxis.     10/27/2019 1:21 PM   Patient doing well on magnesium sulphate prophylaxis. She has required another  dose IV labetatlol, therefore started oral regimen of 200mg BID labetalol.  No headache, vision change, or right upper quadrant pain.   10/28/19 0830 on labetalol 200 mg bid, doing well  10/29/19 ppd3 Discharge home today. Reviewed PIH s/s, PPD, bleeding precautions, pain management. Follow up in 1 week for bp check. 4 weeks postpartum visit. Continue labetalol 200mg BID. Denies neuro symptoms          Final Active Diagnoses:    Diagnosis Date Noted POA    PRINCIPAL PROBLEM:  , delivered [O34.219] 10/26/2019 Unknown    Hypertension in pregnancy, preeclampsia, delivered [O14.94] 10/27/2019 Yes    Single live birth [Z37.0] 10/26/2019 Not Applicable    Rh negative status during pregnancy in third trimester [O26.893, Z67.91] 2015 Yes      Problems Resolved During this Admission:    Diagnosis Date Noted Date Resolved POA    Previous  delivery affecting pregnancy [O34.219] 10/26/2019 10/26/2019 Yes        Labs: All labs within the past 24 hours have been reviewed        Immunizations     Date Immunization Status Dose Route/Site Given by    10/26/19 1518 MMR Incomplete 0.5 mL Subcutaneous/Left deltoid     10/26/19 1518 Tdap Incomplete 0.5 mL Intramuscular/Left deltoid     10/26/19 1518 Rho (D) Immune Globulin - IM Incomplete 300 mcg Intramuscular/           Delivery:    Episiotomy: None   Lacerations: None   Repair suture: None   Repair # of packets:     Blood loss (ml): 150     Birth information:  YOB: 2021   Time of birth: 12:09 PM   Sex: female   Delivery type: , Spontaneous   Gestational Age: <None>    Delivery Clinician:      Other providers:       Additional  information:  Forceps:    Vacuum:    Breech:    Observed anomalies      Living?:           APGARS  One minute Five minutes Ten minutes   Skin color:         Heart rate:          Grimace:         Muscle tone:         Breathing:         Totals: 8  9        Placenta: Delivered:       appearance    Pending Diagnostic Studies:     Procedure Component Value Units Date/Time    Hepatitis B e antibody [[id number]] Collected:  10/26/19 1200    Order Status:  Sent Lab Status:  In process Updated:  10/27/19 0845    Specimen:  Blood     Rubella antibody, IgM [[id number]] Collected:  10/26/19 1200    Order Status:  Sent Lab Status:  In process Updated:  10/27/19 0845    Specimen:  Blood           Discharged Condition: good    Disposition: Home or Self Care    Follow Up:  Follow-up Information     Etta Winkler CNM In 1 week.    Specialty:  Obstetrics and Gynecology  Why:  bp check   Contact information:  [address] [place] [id number]  [phone]             Etta Winkler CNM In 4 weeks.    Specialty:  Obstetrics and Gynecology  Why:  routine PP visit   Contact information:  [address] [place] [id number]  [phone]                 Patient Instructions:   No discharge procedures on file.  Medications:  Current Discharge Medication List      CONTINUE these medications which have NOT CHANGED    Details   amoxicillin (AMOXIL) 500 MG capsule Take 500 mg by mouth 3 (three) times daily.      loratadine (CLARITIN) 10 mg tablet Take 10 mg by mouth once daily.         STOP taking these medications       ibuprofen (ADVIL,MOTRIN) 800 MG tablet Comments:   Reason for Stopping:               Danial Peterson CNM  Obstetrics  [facility]

## 2019-10-29 NOTE — ASSESSMENT & PLAN NOTE
Discharge home today. Reviewed PPD, PIH s/s, bleeding precautions, pain management. Reviewed PP 1 week BP follow up. Denies neuro symptoms

## 2019-10-29 NOTE — SUBJECTIVE & OBJECTIVE
Hospital course:  LMP 19- 40w, presents in labor, no PNC  Previous c/s x 2, 1st del- vaginal, 2nd and 3rd c/s  SVE 8cm, Dr Martinez called  Preparing for   1205- screaming she needs to push, SVE complete with head , SROM thick meconium, NICU called  1209 Vaginal delivery   1239- Cytotec given po, Dr Martinez called   1249 Placenta delivered  4:03 PM called to evaluate recent excess bleeding with clots. Total EBL since (and including) delivery, was 400mL. Discussed manual exam w pt - she agreed. With this exam the c/s scar was palpable and noted to be intact. Lower uterine segment noted to be atonic. No retained products were appreciated. Pt was able to tolerate the exam. With massage, the uterus responded with good tonicity. Several small clots about  2x5mm size were removed with about 30cc. No active bleeding noted to follow. Made plan to administer additional bag of pitocin, and hemabate. (will abstain from methergine as blood pressure were elevated, however improved now at 150s/70s, PCR nl, No headache, vision change, or right upper quadrant pain).     10/26/2019 5:25 PM   Vaginal bleeding scant. Fundus firm. Still no preeclampsia signs or symptoms, and examination normal with DTRs 1+ all extremities, no clonus,  but Blood pressure is now 180/100. Labetalol protocol ordered.     10/26/2019 6:04 PM   Repeat blood pressures 150/80s after 20mg IV labetalol.   no complaints. Blood pressures suspected to be reaction to amphetamines, but will keep low threshold for preeclampsia seizure prophylaxis.     10/27/2019 1:21 PM   Patient doing well on magnesium sulphate prophylaxis. She has required another  dose IV labetatlol, therefore started oral regimen of 200mg BID labetalol.  No headache, vision change, or right upper quadrant pain.   10/28/19 0830 on labetalol 200 mg bid, doing well  10/29/19 ppd3 Discharge home today. Reviewed PIH s/s, PPD, bleeding precautions, pain management. Follow up in 1  week for bp check. 4 weeks postpartum visit. Continue labetalol 200mg BID. Denies neuro symptoms     Interval History:     She is doing well this morning. She is tolerating a regular diet without nausea or vomiting. She is voiding spontaneously. She is ambulating. Vaginal bleeding is mild. She denies fever or chills. Abdominal pain is mild and controlled with oral medications. She is not breastfeeding.     Objective:     Vital Signs (Most Recent):  Temp: 98.5 °F (36.9 °C) (10/29/19 0800)  Pulse: 69 (10/29/19 0800)  Resp: 18 (10/29/19 0800)  BP: 139/74 (10/29/19 0800)  SpO2: 99 % (10/27/19 2019) Vital Signs (24h Range):  Temp:  [98 °F (36.7 °C)-98.9 °F (37.2 °C)] 98.5 °F (36.9 °C)  Pulse:  [63-85] 69  Resp:  [16-18] 18  BP: (138-173)/(67-86) 139/74        There is no height or weight on file to calculate BMI.    No intake or output data in the 24 hours ending 10/29/19 0822    Significant Labs:  Lab Results   Component Value Date    GROUPTRH O NEG 10/26/2019    HEPBSAG Negative 05/06/2016    STREPBCULT No Group B Streptococcus isolated 10/06/2016     No results for input(s): HGB, HCT in the last 48 hours.    I have personallly reviewed all pertinent lab results from the last 24 hours.    Physical Exam:   Constitutional: She is oriented to person, place, and time. She appears well-developed and well-nourished.    HENT:   Head: Normocephalic.     Neck: Normal range of motion.    Cardiovascular: Normal rate, regular rhythm, normal heart sounds and intact distal pulses.     Pulmonary/Chest: Effort normal and breath sounds normal.        Abdominal: Soft.   Non tender      Genitourinary: Vagina normal and uterus normal.           Musculoskeletal: Normal range of motion and moves all extremeties.       Neurological: She is alert and oriented to person, place, and time.    Skin: Skin is warm and dry.    Psychiatric: She has a normal mood and affect. Her behavior is normal. Judgment and thought content normal.

## 2019-10-29 NOTE — NURSING
VSS, bleeding light, fundus firm without massage. Discharge instructions reviewed with pt and she verbalizes understanding. AVS handout given. Discharged to car via wheelchair by staff.

## 2019-10-29 NOTE — PROGRESS NOTES
Notified CAMMIE Quiles on secure chat of pts blood pressure of 164/85 at 1930. Pt received her dose of PO Labetalol 200 mg at 2000 along with a Norco 5 for tooth pain. Upon recheck at 2125, pts blood pressure was 173/86. No complaints of headache, blurred vision, epigastric pain, etc. Questioned if I should follow orders of IV Labetalol that's in pts orders.    Per CNM, only orders were to give time for med to work andto  check BPs every 4 hours.    Will continue to monitor.

## 2019-10-29 NOTE — DISCHARGE INSTRUCTIONS

## 2019-10-29 NOTE — PLAN OF CARE
Pt is up ad glynn and voiding without difficulty. Pain controlled with po meds. On Labetalol BID for increased blood pressures. B/P at midnight was improved from earlier at 140/69. Encouraged pt to get some rest. Will continue to monitor.

## 2019-10-29 NOTE — PROGRESS NOTES
[facility] -   Obstetrics  Postpartum Progress Note    Patient Name: Chiqui Macias  MRN: [id number]  Admission Date: 10/26/2019  Hospital Length of Stay: 3 days  Attending Physician: Diana Martinez MD  Primary Care Provider: Primary Doctor No    Subjective:     Principal Problem:, delivered    Hospital course:  LMP 19- 40w, presents in labor, no PNC  Previous c/s x 2, 1st del- vaginal, 2nd and 3rd c/s  SVE 8cm, Dr Martinez called  Preparing for   1205- screaming she needs to push, SVE complete with head , SROM thick meconium, NICU called  1209 Vaginal delivery   1239- Cytotec given po, Dr Martinez called   1249 Placenta delivered  4:03 PM called to evaluate recent excess bleeding with clots. Total EBL since (and including) delivery, was 400mL. Discussed manual exam w pt - she agreed. With this exam the c/s scar was palpable and noted to be intact. Lower uterine segment noted to be atonic. No retained products were appreciated. Pt was able to tolerate the exam. With massage, the uterus responded with good tonicity. Several small clots about  2x5mm size were removed with about 30cc. No active bleeding noted to follow. Made plan to administer additional bag of pitocin, and hemabate. (will abstain from methergine as blood pressure were elevated, however improved now at 150s/70s, PCR nl, No headache, vision change, or right upper quadrant pain).     10/26/2019 5:25 PM   Vaginal bleeding scant. Fundus firm. Still no preeclampsia signs or symptoms, and examination normal with DTRs 1+ all extremities, no clonus,  but Blood pressure is now 180/100. Labetalol protocol ordered.     10/26/2019 6:04 PM   Repeat blood pressures 150/80s after 20mg IV labetalol.   no complaints. Blood pressures suspected to be reaction to amphetamines, but will keep low threshold for preeclampsia seizure prophylaxis.     10/27/2019 1:21 PM   Patient doing well on magnesium sulphate prophylaxis. She  has required another  dose IV labetatlol, therefore started oral regimen of 200mg BID labetalol.  No headache, vision change, or right upper quadrant pain.   10/28/19 0830 on labetalol 200 mg bid, doing well  10/29/19 ppd3 Discharge home today. Reviewed PIH s/s, PPD, bleeding precautions, pain management. Follow up in 1 week for bp check. 4 weeks postpartum visit. Continue labetalol 200mg BID. Denies neuro symptoms     Interval History:     She is doing well this morning. She is tolerating a regular diet without nausea or vomiting. She is voiding spontaneously. She is ambulating. Vaginal bleeding is mild. She denies fever or chills. Abdominal pain is mild and controlled with oral medications. She is not breastfeeding.     Objective:     Vital Signs (Most Recent):  Temp: 98.5 °F (36.9 °C) (10/29/19 0800)  Pulse: 69 (10/29/19 0800)  Resp: 18 (10/29/19 0800)  BP: 139/74 (10/29/19 0800)  SpO2: 99 % (10/27/19 2019) Vital Signs (24h Range):  Temp:  [98 °F (36.7 °C)-98.9 °F (37.2 °C)] 98.5 °F (36.9 °C)  Pulse:  [63-85] 69  Resp:  [16-18] 18  BP: (138-173)/(67-86) 139/74        There is no height or weight on file to calculate BMI.    No intake or output data in the 24 hours ending 10/29/19 0822    Significant Labs:  Lab Results   Component Value Date    GROUPTRH O NEG 10/26/2019    HEPBSAG Negative 05/06/2016    STREPBCULT No Group B Streptococcus isolated 10/06/2016     No results for input(s): HGB, HCT in the last 48 hours.    I have personallly reviewed all pertinent lab results from the last 24 hours.    Physical Exam:   Constitutional: She is oriented to person, place, and time. She appears well-developed and well-nourished.    HENT:   Head: Normocephalic.     Neck: Normal range of motion.    Cardiovascular: Normal rate, regular rhythm, normal heart sounds and intact distal pulses.     Pulmonary/Chest: Effort normal and breath sounds normal.        Abdominal: Soft.   Non tender      Genitourinary: Vagina normal and  uterus normal.           Musculoskeletal: Normal range of motion and moves all extremeties.       Neurological: She is alert and oriented to person, place, and time.    Skin: Skin is warm and dry.    Psychiatric: She has a normal mood and affect. Her behavior is normal. Judgment and thought content normal.       Assessment/Plan:     29 y.o. female  for:    * , delivered  Discharge home today. Reviewed PPD, PIH s/s, bleeding precautions, pain management. Reviewed PP 1 week BP follow up. Denies neuro symptoms     Hypertension in pregnancy, preeclampsia, delivered  Continue labetalol BID, BP recheck 1 week postpartum     Single live birth  Infant under care of peds department N[facility] at [facility]     Rh negative status during pregnancy in third trimester  Rhogam per protocol         Disposition: As patient meets milestones, will plan to discharge today.    Danial Peterson CNM  Obstetrics  [facility] - [facility]

## 2019-10-30 LAB — RUBV IGM SER IA-ACNC: <10 AU/ML

## 2019-11-04 NOTE — PHYSICIAN QUERY
PT Name: Chiqui Macias  MR #: [id number]     Physician Query Form - Documentation Clarification      CDS/: Jennifer Morin               Contact information: [email or web address]  This form is a permanent document in the medical record.     Query Date: November 4, 2019    By submitting this query, we are merely seeking further clarification of documentation. Please utilize your independent clinical judgment when addressing the question(s) below.    The Medical record reflects the following:    Supporting Clinical Findings Location in Medical Record   10/26/2019 5:25 PM   Vaginal bleeding scant. Fundus firm. Still no preeclampsia signs or symptoms, and examination normal with DTRs 1+ all extremities, no clonus,  but Blood pressure is now 180/100. Labetalol protocol ordered.      10/26/2019 6:04 PM   Repeat blood pressures 150/80s after 20mg IV labetalol.   no complaints. Blood pressures suspected to be reaction to amphetamines, but will keep low threshold for preeclampsia seizure prophylaxis.    Final Active Diagnoses:   Hypertension in pregnancy, preeclampsia, delivered         DC summary 10/29                                                                                      Doctor, Please specify diagnosis or diagnoses associated with above clinical findings.    Doctor, Please specify diagnosis of Hypertension.     Provider Use Only        [  x ] Mild to Moderate Pre-eclampsia     [   ] Elevated blood pressure secondary to amphetamines    [   ] Other, please specify_________________________________                                                                                                              [  ] Clinically Undetermined

## 2019-11-04 NOTE — PHYSICIAN QUERY
PT Name: Chiqui Macias  MR #: [id number]     Physician Query Form - Documentation Clarification      CDS/: Jennifer Morin               Contact information:  [email or web address]    This form is a permanent document in the medical record.     Query Date: 2019    By submitting this query, we are merely seeking further clarification of documentation. Please utilize your independent clinical judgment when addressing the question(s) below.    The Medical record reflects the following:    Supporting Clinical Findings Location in Medical Record   4:03 PM called to evaluate recent excess bleeding with clots. Total EBL since (and including) delivery, was 400mL. Discussed manual exam w pt - she agreed. With this exam the c/s scar was palpable and noted to be intact. Lower uterine segment noted to be atonic. No retained products were appreciated. Pt was able to tolerate the exam. With massage, the uterus responded with good tonicity. Several small clots about  2x5mm size were removed with about 30cc. No active bleeding noted to follow. Made plan to administer additional bag of pitocin, and hemabate. (will abstain from methergine as blood pressure were elevated, however improved now at 150s/70s, PCR nl, No headache, vision change, or right upper quadrant pain).      PRINCIPAL PROBLEM:  , delivered  DC summary 10/29                                                                                      Doctor, Please specify diagnosis or diagnoses associated with above clinical findings.    Provider Use Only      [ x ] Uterine atony with bleeding, clinically insignificant    [  ] Immediate post partum hemorrhage    [  ] Post partum hemorrhage, clinically insignificant    [  ] Other, please specify:___________________________________                                                                                                                [  ] Clinically Undetermined

## 2019-11-05 ENCOUNTER — POSTPARTUM VISIT (OUTPATIENT)
Dept: OBSTETRICS AND GYNECOLOGY | Facility: CLINIC | Age: 29
End: 2019-11-05
Payer: MEDICAID

## 2019-11-05 VITALS
SYSTOLIC BLOOD PRESSURE: 180 MMHG | WEIGHT: 228.38 LBS | BODY MASS INDEX: 35.84 KG/M2 | DIASTOLIC BLOOD PRESSURE: 130 MMHG | HEIGHT: 67 IN

## 2019-11-05 PROCEDURE — 99213 PR OFFICE/OUTPT VISIT, EST, LEVL III, 20-29 MIN: ICD-10-PCS | Mod: S$PBB,TH,, | Performed by: OBSTETRICS & GYNECOLOGY

## 2019-11-05 PROCEDURE — 99999 PR PBB SHADOW E&M-EST. PATIENT-LVL II: CPT | Mod: PBBFAC,,,

## 2019-11-05 PROCEDURE — 99999 PR PBB SHADOW E&M-EST. PATIENT-LVL II: ICD-10-PCS | Mod: PBBFAC,,,

## 2019-11-05 PROCEDURE — 99213 OFFICE O/P EST LOW 20 MIN: CPT | Mod: S$PBB,TH,, | Performed by: OBSTETRICS & GYNECOLOGY

## 2019-11-05 PROCEDURE — 99212 OFFICE O/P EST SF 10 MIN: CPT | Mod: PBBFAC,TH

## 2019-11-05 RX ORDER — LABETALOL 200 MG/1
200 TABLET, FILM COATED ORAL 2 TIMES DAILY
Qty: 60 TABLET | Refills: 3 | Status: SHIPPED | OUTPATIENT
Start: 2019-11-05

## 2019-11-05 NOTE — PROGRESS NOTES
"  Subjective:       Patient ID: Chiqui Macias is a 29 y.o. female.    Chief Complaint:  Blood Pressure Check      History of Present Illness  HPI  Patient is here today for blood pressure check 10 days s/p precipitous vaginal delivery after caesarean.  She developed PP hypertension and was treated with MgSO4 and IV labetalol, with transition to PO labetolol 200mg BID while in hospital.  Upon discharge, patient was supposed to receive prescription for the labetalol, but the Rx was not sent in. Per patient, she has been having intermittent headaches, but she relates these to her "Crying a lot" due to her mom's passing away this weekend.  Otherwise she is asymptomatic- denies CP, SOB, vision changes.        GYN & OB History  No LMP recorded.   Date of Last Pap: 2015    OB History    Para Term  AB Living   5 4 2 1 1 3   SAB TAB Ectopic Multiple Live Births   1     0 4      # Outcome Date GA Lbr Gume/2nd Weight Sex Delivery Anes PTL Lv   5 Para 10/26/19   2.925 kg (6 lb 7.2 oz) F  None N BETTINA   4 Term 16 39w3d  3.12 kg (6 lb 14.1 oz) F CS-LTranv Gen, EPI N BETTINA   3  10/06/15 36w2d  2.08 kg (4 lb 9.4 oz) F CS-LTranv EPI N ND   2 Term 09 40w0d  3.087 kg (6 lb 12.9 oz) M Vag-Spont EPI N BETTINA   1 SAB 08 4w0d              Review of Systems  Review of Systems   Eyes: Negative for visual disturbance.   Respiratory: Negative for shortness of breath.    Cardiovascular: Negative for chest pain, palpitations and leg swelling.   Gastrointestinal: Negative for abdominal pain, nausea and vomiting.   Genitourinary: Positive for vaginal bleeding.   Musculoskeletal: Negative for back pain.   Neurological: Positive for headaches. Negative for seizures, syncope and numbness.   Psychiatric/Behavioral: Positive for depression (patient says she would like to avoid taking medications at this time, but encouraged her to reach out and utilize resources).           Objective:    Physical Exam: "   Constitutional: She is oriented to person, place, and time. She appears well-developed and well-nourished. No distress.       Cardiovascular: Regular rhythm and normal heart sounds.     Pulmonary/Chest: Effort normal and breath sounds normal.        Abdominal: Soft. Bowel sounds are normal.                 Neurological: She is alert and oriented to person, place, and time. She displays normal reflexes.   No clonus      Skin: Skin is warm and dry.    Psychiatric: She has a normal mood and affect.   Tearful when talking about mother's death 3 days ago            Assessment:        1. Hypertension in pregnancy, preeclampsia, delivered                Plan:      Labetalol 200mg BID sent to patient's pharmacy; reviewed symptoms for which patient should contact the clinic or go to the ED.  She verbalizes understanding.  After patient has taken oral medication for a couple days, she will follow up in clinic for repeat BP check on Thursday or Friday of this week.

## 2019-11-27 ENCOUNTER — TELEPHONE (OUTPATIENT)
Dept: PHARMACY | Facility: CLINIC | Age: 29
End: 2019-11-27

## 2019-11-27 ENCOUNTER — POSTPARTUM VISIT (OUTPATIENT)
Dept: OBSTETRICS AND GYNECOLOGY | Facility: CLINIC | Age: 29
End: 2019-11-27
Payer: MEDICAID

## 2019-11-27 VITALS
SYSTOLIC BLOOD PRESSURE: 136 MMHG | HEIGHT: 67 IN | WEIGHT: 232.81 LBS | BODY MASS INDEX: 36.54 KG/M2 | DIASTOLIC BLOOD PRESSURE: 84 MMHG

## 2019-11-27 PROBLEM — O34.219 VBAC, DELIVERED: Status: RESOLVED | Noted: 2019-10-26 | Resolved: 2019-11-27

## 2019-11-27 PROCEDURE — 99999 PR PBB SHADOW E&M-EST. PATIENT-LVL II: ICD-10-PCS | Mod: PBBFAC,,, | Performed by: ADVANCED PRACTICE MIDWIFE

## 2019-11-27 PROCEDURE — 99999 PR PBB SHADOW E&M-EST. PATIENT-LVL II: CPT | Mod: PBBFAC,,, | Performed by: ADVANCED PRACTICE MIDWIFE

## 2019-11-27 PROCEDURE — 99212 OFFICE O/P EST SF 10 MIN: CPT | Mod: PBBFAC | Performed by: ADVANCED PRACTICE MIDWIFE

## 2019-11-27 PROCEDURE — 59430 PR CARE AFTER DELIVERY ONLY: ICD-10-PCS | Mod: ,,, | Performed by: ADVANCED PRACTICE MIDWIFE

## 2019-11-27 RX ORDER — MEDROXYPROGESTERONE ACETATE 150 MG/ML
150 INJECTION, SUSPENSION INTRAMUSCULAR
Status: ACTIVE | OUTPATIENT
Start: 2019-11-28

## 2019-11-27 NOTE — PROGRESS NOTES
Subjective:       Patient ID: Chiqui Macias is a 29 y.o. female.    Chief Complaint: Postpartum Care    Presents today for routine PP F/U  Delivery note and progress notes reviewed  Patient was discharged home on Labetalol 200 BID, did not take yet this morning  Continues to mourn mothers death, states holidays making it difficult  States very close to her sisters and brother and has a lot of emotional support  No PNC, desires Mirena, order placed  States unprotected sex yesterday.  Desires a depo shot until can have Mirena placed, order for depo placed advised to  Call when she is on her period to come get injection.  Advised no unprotected sex  Advised to make appointment with PCP to follow her BP    Review of Systems   Psychiatric/Behavioral:        Sad with appropriate behavior, lost mother day after baby born.  Mother had been sick for several years   All other systems reviewed and are negative.      Objective:      Physical Exam   Constitutional: She is oriented to person, place, and time. She appears well-developed and well-nourished.   Pulmonary/Chest: Effort normal.   Genitourinary: Vagina normal and uterus normal.   Musculoskeletal: Normal range of motion.   Neurological: She is alert and oriented to person, place, and time.   Skin: Skin is warm and dry.   Psychiatric: She has a normal mood and affect. Her behavior is normal.   Vitals reviewed.      Assessment:       1. Routine postpartum follow-up        Plan:       Mirena order placed  Depo x's 1 with onset of menses

## 2019-12-24 NOTE — TELEPHONE ENCOUNTER
LVM to notify pt Mirena is covered with $0.00 copay on pharmacy benefit. Need to offer pharmacist consultation and obtain consent to deliver to [facility]. Will follow up if no return call is received.

## 2020-05-25 ENCOUNTER — TELEPHONE (OUTPATIENT)
Dept: OBSTETRICS AND GYNECOLOGY | Facility: CLINIC | Age: 30
End: 2020-05-25

## 2020-05-25 DIAGNOSIS — Z30.430 ENCOUNTER FOR INSERTION OF MIRENA IUD: Primary | ICD-10-CM

## 2020-05-25 NOTE — TELEPHONE ENCOUNTER
Pre-authorization has been sent.     ----- Message from Virginia Rocha CNM sent at 5/20/2020  3:05 PM CDT -----  Can someone please initiate buy and bill to get thins started for PA for Ms Macias.  Thank you

## 2020-06-18 ENCOUNTER — TELEPHONE (OUTPATIENT)
Dept: OBSTETRICS AND GYNECOLOGY | Facility: CLINIC | Age: 30
End: 2020-06-18

## 2020-06-18 NOTE — TELEPHONE ENCOUNTER
Mirena approved as buy and bill.  Attempted to call patient, no answer, left message to call office.    June 16, 2020     The  [id number] - DEVICE AUTHORIZATIONS     - GA LEVONORGESTREL-RELEASING IUD (MIRENA 5YR) 52 MG    33731 (CPT®) - [place] INSERT INTRAUTERINE DEVICE order placed by Virginia Rocha for Chiqui Macias is now AUTHORIZED by MEDICAID for 1 visit(s). Please schedule the patient before the authorization expiration date, 05/25/2021, and confirm the authorized referral (ID# [id number]) is linked to the visit.     For questions, send an In Basket message to the Pre Service Intake pool or call the [facility] Pre-Service department at [phone]. Pre-Service department hours are M-F 8am to 5pm.        Information for Referral # [id number]:        Diagnoses:    Z30.430 (ICD-10-CM) - Encounter for insertion of mirena IUD             Procedures:    DEVICE AUTHORIZATIONS [TAR980]    GA LEVONORGESTREL-RELEASING IUD (MIRENA 5YR) 52 MG []    GA INSERT INTRAUTERINE DEVICE [43147 (CPT®)]     Device:    Mirena  Mirena

## 2020-06-18 NOTE — TELEPHONE ENCOUNTER
Attempted to contact patient, no answer. Left patient voice mail to return call to clinic.    Please help scheduled patient for mirena (B&B) insertion.         June 16, 2020     The  [id number] - DEVICE AUTHORIZATIONS     - [place] LEVONORGESTREL-RELEASING IUD (MIRENA 5YR) 52 MG    10242 (CPT®) - [place] INSERT INTRAUTERINE DEVICE order placed by Virginia Rocha for Chiqui Macias is now AUTHORIZED by MEDICAID for 1 visit(s). Please schedule the patient before the authorization expiration date, 05/25/2021, and confirm the authorized referral (ID# [id number]) is linked to the visit.     For questions, send an In Basket message to the Pre Service Intake pool or call the [facility] Pre-Service department at [phone]. Pre-Service department hours are M-F 8am to 5pm.        Information for Referral # [id number]:        Diagnoses:    Z30.430 (ICD-10-CM) - Encounter for insertion of mirena IUD             Procedures:    DEVICE AUTHORIZATIONS [WGF727]    MN LEVONORGESTREL-RELEASING IUD (MIRENA 5YR) 52 MG []    MN INSERT INTRAUTERINE DEVICE [64890 (CPT®)]     Device:    Mirena  Mirena

## 2021-05-06 ENCOUNTER — PATIENT MESSAGE (OUTPATIENT)
Dept: RESEARCH | Facility: HOSPITAL | Age: 31
End: 2021-05-06

## 2021-12-14 ENCOUNTER — OFFICE VISIT (OUTPATIENT)
Dept: OBSTETRICS AND GYNECOLOGY | Facility: CLINIC | Age: 31
End: 2021-12-14
Payer: MEDICAID

## 2021-12-14 ENCOUNTER — LAB VISIT (OUTPATIENT)
Dept: LAB | Facility: HOSPITAL | Age: 31
End: 2021-12-14
Attending: STUDENT IN AN ORGANIZED HEALTH CARE EDUCATION/TRAINING PROGRAM
Payer: MEDICAID

## 2021-12-14 VITALS
RESPIRATION RATE: 16 BRPM | WEIGHT: 248.44 LBS | DIASTOLIC BLOOD PRESSURE: 88 MMHG | BODY MASS INDEX: 38.99 KG/M2 | HEIGHT: 67 IN | SYSTOLIC BLOOD PRESSURE: 130 MMHG

## 2021-12-14 DIAGNOSIS — N91.0 DELAYED MENSES: Primary | ICD-10-CM

## 2021-12-14 DIAGNOSIS — N91.0 DELAYED MENSES: ICD-10-CM

## 2021-12-14 LAB
B-HCG UR QL: NEGATIVE
CTP QC/QA: YES

## 2021-12-14 PROCEDURE — 99999 PR PBB SHADOW E&M-EST. PATIENT-LVL III: CPT | Mod: PBBFAC,,, | Performed by: STUDENT IN AN ORGANIZED HEALTH CARE EDUCATION/TRAINING PROGRAM

## 2021-12-14 PROCEDURE — 86900 BLOOD TYPING SEROLOGIC ABO: CPT | Performed by: STUDENT IN AN ORGANIZED HEALTH CARE EDUCATION/TRAINING PROGRAM

## 2021-12-14 PROCEDURE — 81025 URINE PREGNANCY TEST: CPT | Mod: PBBFAC,PN | Performed by: STUDENT IN AN ORGANIZED HEALTH CARE EDUCATION/TRAINING PROGRAM

## 2021-12-14 PROCEDURE — 99999 PR PBB SHADOW E&M-EST. PATIENT-LVL III: ICD-10-PCS | Mod: PBBFAC,,, | Performed by: STUDENT IN AN ORGANIZED HEALTH CARE EDUCATION/TRAINING PROGRAM

## 2021-12-14 PROCEDURE — 36415 COLL VENOUS BLD VENIPUNCTURE: CPT | Mod: PN | Performed by: STUDENT IN AN ORGANIZED HEALTH CARE EDUCATION/TRAINING PROGRAM

## 2021-12-14 PROCEDURE — 99213 OFFICE O/P EST LOW 20 MIN: CPT | Mod: PBBFAC,PN | Performed by: STUDENT IN AN ORGANIZED HEALTH CARE EDUCATION/TRAINING PROGRAM

## 2021-12-14 PROCEDURE — 99213 OFFICE O/P EST LOW 20 MIN: CPT | Mod: S$PBB,,, | Performed by: STUDENT IN AN ORGANIZED HEALTH CARE EDUCATION/TRAINING PROGRAM

## 2021-12-14 PROCEDURE — 99213 PR OFFICE/OUTPT VISIT, EST, LEVL III, 20-29 MIN: ICD-10-PCS | Mod: S$PBB,,, | Performed by: STUDENT IN AN ORGANIZED HEALTH CARE EDUCATION/TRAINING PROGRAM

## 2021-12-14 PROCEDURE — 84702 CHORIONIC GONADOTROPIN TEST: CPT | Performed by: STUDENT IN AN ORGANIZED HEALTH CARE EDUCATION/TRAINING PROGRAM

## 2021-12-15 LAB
ABO + RH BLD: NORMAL
HCG INTACT+B SERPL-ACNC: <2.4 MIU/ML

## 2022-10-25 ENCOUNTER — OFFICE VISIT (OUTPATIENT)
Dept: OBSTETRICS AND GYNECOLOGY | Facility: CLINIC | Age: 32
End: 2022-10-25
Payer: MEDICAID

## 2022-10-25 VITALS — BODY MASS INDEX: 47.24 KG/M2 | WEIGHT: 293 LBS | DIASTOLIC BLOOD PRESSURE: 86 MMHG | SYSTOLIC BLOOD PRESSURE: 122 MMHG

## 2022-10-25 DIAGNOSIS — N93.9 ABNORMAL UTERINE BLEEDING: Primary | ICD-10-CM

## 2022-10-25 PROCEDURE — 3074F PR MOST RECENT SYSTOLIC BLOOD PRESSURE < 130 MM HG: ICD-10-PCS | Mod: CPTII,,, | Performed by: STUDENT IN AN ORGANIZED HEALTH CARE EDUCATION/TRAINING PROGRAM

## 2022-10-25 PROCEDURE — 1159F MED LIST DOCD IN RCRD: CPT | Mod: CPTII,,, | Performed by: STUDENT IN AN ORGANIZED HEALTH CARE EDUCATION/TRAINING PROGRAM

## 2022-10-25 PROCEDURE — 99999 PR PBB SHADOW E&M-EST. PATIENT-LVL III: ICD-10-PCS | Mod: PBBFAC,,, | Performed by: STUDENT IN AN ORGANIZED HEALTH CARE EDUCATION/TRAINING PROGRAM

## 2022-10-25 PROCEDURE — 99214 OFFICE O/P EST MOD 30 MIN: CPT | Mod: S$PBB,,, | Performed by: STUDENT IN AN ORGANIZED HEALTH CARE EDUCATION/TRAINING PROGRAM

## 2022-10-25 PROCEDURE — 3074F SYST BP LT 130 MM HG: CPT | Mod: CPTII,,, | Performed by: STUDENT IN AN ORGANIZED HEALTH CARE EDUCATION/TRAINING PROGRAM

## 2022-10-25 PROCEDURE — 99214 PR OFFICE/OUTPT VISIT, EST, LEVL IV, 30-39 MIN: ICD-10-PCS | Mod: S$PBB,,, | Performed by: STUDENT IN AN ORGANIZED HEALTH CARE EDUCATION/TRAINING PROGRAM

## 2022-10-25 PROCEDURE — 3079F DIAST BP 80-89 MM HG: CPT | Mod: CPTII,,, | Performed by: STUDENT IN AN ORGANIZED HEALTH CARE EDUCATION/TRAINING PROGRAM

## 2022-10-25 PROCEDURE — 3079F PR MOST RECENT DIASTOLIC BLOOD PRESSURE 80-89 MM HG: ICD-10-PCS | Mod: CPTII,,, | Performed by: STUDENT IN AN ORGANIZED HEALTH CARE EDUCATION/TRAINING PROGRAM

## 2022-10-25 PROCEDURE — 99213 OFFICE O/P EST LOW 20 MIN: CPT | Mod: PBBFAC,PN | Performed by: STUDENT IN AN ORGANIZED HEALTH CARE EDUCATION/TRAINING PROGRAM

## 2022-10-25 PROCEDURE — 99999 PR PBB SHADOW E&M-EST. PATIENT-LVL III: CPT | Mod: PBBFAC,,, | Performed by: STUDENT IN AN ORGANIZED HEALTH CARE EDUCATION/TRAINING PROGRAM

## 2022-10-25 PROCEDURE — 1159F PR MEDICATION LIST DOCUMENTED IN MEDICAL RECORD: ICD-10-PCS | Mod: CPTII,,, | Performed by: STUDENT IN AN ORGANIZED HEALTH CARE EDUCATION/TRAINING PROGRAM

## 2022-10-25 NOTE — PROGRESS NOTES
History & Physical  Gynecology      SUBJECTIVE:     Chief Complaint: Metrorrhagia       History of Present Illness:    32 y.o. here today for AUB. Past 3 months periods have been longer and heavier. Not on on contraception. Worried about the heavier bleeding. + clots, will skip sometimes and then sometimes have period every 2 weeks.     Review of patient's allergies indicates:  No Known Allergies    Past Medical History:   Diagnosis Date    Environmental allergies     Thyroid disease     hypothyroidism     Past Surgical History:   Procedure Laterality Date     SECTION      x 2    TONSILLECTOMY, ADENOIDECTOMY       OB History          5    Para   4    Term   2       1    AB   1    Living   3         SAB   1    IAB        Ectopic        Multiple   0    Live Births   4               Family History   Problem Relation Age of Onset    Breast cancer Neg Hx     Colon cancer Neg Hx     Ovarian cancer Neg Hx     Thrombosis Neg Hx     Cervical cancer Neg Hx     Uterine cancer Neg Hx      Social History     Tobacco Use    Smoking status: Every Day     Packs/day: 0.50     Years: 10.00     Pack years: 5.00     Types: Cigarettes    Smokeless tobacco: Never   Substance Use Topics    Alcohol use: No     Alcohol/week: 0.0 standard drinks    Drug use: Yes     Frequency: 3.0 times per week     Types: Marijuana     Comment: stopped 2weeks ago        Current Outpatient Medications   Medication Sig    amoxicillin (AMOXIL) 500 MG capsule Take 500 mg by mouth 3 (three) times daily.    labetalol (NORMODYNE) 200 MG tablet Take 1 tablet (200 mg total) by mouth 2 (two) times daily.    loratadine (CLARITIN) 10 mg tablet Take 10 mg by mouth once daily.     Current Facility-Administered Medications   Medication    medroxyPROGESTERone (DEPO-PROVERA) injection 150 mg         Review of Systems:  Review of Systems   Constitutional:  Negative for chills, fatigue and fever.   HENT:  Negative for congestion.    Eyes:  Negative for  visual disturbance.   Respiratory:  Negative for cough and shortness of breath.    Cardiovascular:  Negative for chest pain and palpitations.   Gastrointestinal:  Negative for abdominal distention, abdominal pain, constipation, diarrhea, nausea and vomiting.   Genitourinary:  Negative for difficulty urinating, dysuria, hematuria, vaginal bleeding and vaginal discharge.   Skin:  Negative for rash.   Neurological:  Negative for dizziness, seizures, light-headedness and headaches.   Hematological:  Does not bruise/bleed easily.   Psychiatric/Behavioral:  Negative for dysphoric mood. The patient is not nervous/anxious.       OBJECTIVE:     Physical Exam:  Physical Exam  Vitals reviewed.   Constitutional:       General: She is not in acute distress.     Appearance: Normal appearance. She is well-developed.   HENT:      Head: Normocephalic and atraumatic.   Cardiovascular:      Rate and Rhythm: Normal rate and regular rhythm.   Pulmonary:      Effort: Pulmonary effort is normal.   Abdominal:      General: There is no distension.      Palpations: Abdomen is soft.   Genitourinary:     Vagina: Normal.   Skin:     General: Skin is warm.   Neurological:      Mental Status: She is alert and oriented to person, place, and time.   Psychiatric:         Behavior: Behavior normal.         Thought Content: Thought content normal.         Judgment: Judgment normal.         ASSESSMENT:       ICD-10-CM ICD-9-CM    1. Abnormal uterine bleeding  N93.9 626.9 CBC Auto Differential      TSH      US Pelvis Comp with Transvag NON-OB (xpd          Orders Placed This Encounter   Procedures    US Pelvis Comp with Transvag NON-OB (xpd     Standing Status:   Future     Standing Expiration Date:   10/25/2023     Order Specific Question:   May the Radiologist modify the order per protocol to meet the clinical needs of the patient?     Answer:   Yes     Order Specific Question:   Release to patient     Answer:   Immediate    CBC Auto Differential      Standing Status:   Future     Standing Expiration Date:   10/25/2023    TSH     Standing Status:   Future     Standing Expiration Date:   10/25/2023           Plan:      - Patient was counseled today on the causes of AUB including structural and non-structural: fibroids, polyps, adenomyosis and anovulation resulting in endometrial hyperplasia, endometritis, cervicitis; the probable need for a proper evaluation and for a tissue diagnosis was discussed.  A Hysteroscopy D/C  may be necessary. The need for a bHCG, CBC, TSH, cultures, and TVUS was discussed. Further workup may be indicated if suspect patient has a hemophilia. The hormonal treatment options for menorrhagia include: various progesterones,  OCPs + NSAID, IUD-Mirena, TXA, and elagolix. The pros, cons, risks, and benefits of each was discussed. We discussed that if based off of test results or that if medical management fails, other options may include surgery. These include myomectomy, uterine artery embolization,endometrial ablation and Hysterectomy, each was discussed in detail.   During the consultation session, patient was given the opportunity to ask questions and all of her questions were answered.  - TVUS, labs today  - likely needs EMBX based off of BMI  - Discussed options, will likely do embx/IUD at same apt. Has had mirena IUD before.    Gege Tello M.D.  Obstetrics and Gynecology

## 2022-10-26 ENCOUNTER — HOSPITAL ENCOUNTER (OUTPATIENT)
Dept: RADIOLOGY | Facility: HOSPITAL | Age: 32
Discharge: HOME | End: 2022-10-26
Attending: STUDENT IN AN ORGANIZED HEALTH CARE EDUCATION/TRAINING PROGRAM
Payer: MEDICAID

## 2022-10-26 ENCOUNTER — LAB VISIT (OUTPATIENT)
Dept: FAMILY MEDICINE | Facility: CLINIC | Age: 32
End: 2022-10-26
Payer: MEDICAID

## 2022-10-26 DIAGNOSIS — N93.9 ABNORMAL UTERINE BLEEDING: ICD-10-CM

## 2022-10-26 PROCEDURE — 76830 US PELVIS COMP WITH TRANSVAG NON-OB (XPD): ICD-10-PCS | Mod: 26,,, | Performed by: RADIOLOGY

## 2022-10-26 PROCEDURE — 76856 US PELVIS COMP WITH TRANSVAG NON-OB (XPD): ICD-10-PCS | Mod: 26,,, | Performed by: RADIOLOGY

## 2022-10-26 PROCEDURE — 36415 COLL VENOUS BLD VENIPUNCTURE: CPT | Mod: S$GLB,,, | Performed by: STUDENT IN AN ORGANIZED HEALTH CARE EDUCATION/TRAINING PROGRAM

## 2022-10-26 PROCEDURE — 76856 US EXAM PELVIC COMPLETE: CPT | Mod: 26,,, | Performed by: RADIOLOGY

## 2022-10-26 PROCEDURE — 36415 PR COLLECTION VENOUS BLOOD,VENIPUNCTURE: ICD-10-PCS | Mod: S$GLB,,, | Performed by: STUDENT IN AN ORGANIZED HEALTH CARE EDUCATION/TRAINING PROGRAM

## 2022-10-26 PROCEDURE — 76830 TRANSVAGINAL US NON-OB: CPT | Mod: 26,,, | Performed by: RADIOLOGY

## 2022-10-26 PROCEDURE — 84443 ASSAY THYROID STIM HORMONE: CPT | Performed by: STUDENT IN AN ORGANIZED HEALTH CARE EDUCATION/TRAINING PROGRAM

## 2022-10-26 PROCEDURE — 76830 TRANSVAGINAL US NON-OB: CPT | Mod: TC,PO

## 2022-10-26 PROCEDURE — 85025 COMPLETE CBC W/AUTO DIFF WBC: CPT | Performed by: STUDENT IN AN ORGANIZED HEALTH CARE EDUCATION/TRAINING PROGRAM

## 2022-10-26 NOTE — PROGRESS NOTES
Venipuncture performed with 23 gauge butterfly, x's 1 attempt.  Successful venipuncture to L Antecubital vein.  Specimens collected per orders.      Pressure dressing applied to site, instructed patient to remove dressing in 10-15 minutes, OK to re-adjust dressing if pressure causing any discomfort, to observe closely for numbness and/or discoloration to hand or fingers, and to notify provider if bleeding persists after applying constant pressure lasting 30 minutes.

## 2022-10-27 LAB
BASOPHILS # BLD AUTO: 0.03 K/UL (ref 0–0.2)
BASOPHILS NFR BLD: 0.3 % (ref 0–1.9)
DIFFERENTIAL METHOD: ABNORMAL
EOSINOPHIL # BLD AUTO: 0.2 K/UL (ref 0–0.5)
EOSINOPHIL NFR BLD: 2 % (ref 0–8)
ERYTHROCYTE [DISTWIDTH] IN BLOOD BY AUTOMATED COUNT: 13 % (ref 11.5–14.5)
HCT VFR BLD AUTO: 45.1 % (ref 37–48.5)
HGB BLD-MCNC: 14.7 G/DL (ref 12–16)
IMM GRANULOCYTES # BLD AUTO: 0.05 K/UL (ref 0–0.04)
IMM GRANULOCYTES NFR BLD AUTO: 0.4 % (ref 0–0.5)
LYMPHOCYTES # BLD AUTO: 2.8 K/UL (ref 1–4.8)
LYMPHOCYTES NFR BLD: 24.6 % (ref 18–48)
MCH RBC QN AUTO: 29.9 PG (ref 27–31)
MCHC RBC AUTO-ENTMCNC: 32.6 G/DL (ref 32–36)
MCV RBC AUTO: 92 FL (ref 82–98)
MONOCYTES # BLD AUTO: 0.5 K/UL (ref 0.3–1)
MONOCYTES NFR BLD: 4.1 % (ref 4–15)
NEUTROPHILS # BLD AUTO: 7.8 K/UL (ref 1.8–7.7)
NEUTROPHILS NFR BLD: 68.6 % (ref 38–73)
NRBC BLD-RTO: 0 /100 WBC
PLATELET # BLD AUTO: 318 K/UL (ref 150–450)
PMV BLD AUTO: 9.8 FL (ref 9.2–12.9)
RBC # BLD AUTO: 4.91 M/UL (ref 4–5.4)
TSH SERPL DL<=0.005 MIU/L-ACNC: 3.61 UIU/ML (ref 0.4–4)
WBC # BLD AUTO: 11.4 K/UL (ref 3.9–12.7)

## 2022-10-28 ENCOUNTER — TELEPHONE (OUTPATIENT)
Dept: OBSTETRICS AND GYNECOLOGY | Facility: CLINIC | Age: 32
End: 2022-10-28
Payer: MEDICAID

## 2022-10-28 DIAGNOSIS — Z30.018 ENCOUNTER FOR INITIAL PRESCRIPTION OF OTHER CONTRACEPTIVES: Primary | ICD-10-CM

## 2022-11-09 ENCOUNTER — OFFICE VISIT (OUTPATIENT)
Dept: OBSTETRICS AND GYNECOLOGY | Facility: CLINIC | Age: 32
End: 2022-11-09
Payer: MEDICAID

## 2022-11-09 VITALS — DIASTOLIC BLOOD PRESSURE: 86 MMHG | WEIGHT: 293 LBS | SYSTOLIC BLOOD PRESSURE: 124 MMHG | BODY MASS INDEX: 47.51 KG/M2

## 2022-11-09 DIAGNOSIS — Z30.430 ENCOUNTER FOR INSERTION OF MIRENA IUD: Primary | ICD-10-CM

## 2022-11-09 DIAGNOSIS — N93.9 ABNORMAL UTERINE BLEEDING (AUB): ICD-10-CM

## 2022-11-09 LAB
B-HCG UR QL: NEGATIVE
CTP QC/QA: YES

## 2022-11-09 PROCEDURE — 3008F PR BODY MASS INDEX (BMI) DOCUMENTED: ICD-10-PCS | Mod: CPTII,,, | Performed by: STUDENT IN AN ORGANIZED HEALTH CARE EDUCATION/TRAINING PROGRAM

## 2022-11-09 PROCEDURE — 58100 BIOPSY OF UTERUS LINING: CPT | Mod: PBBFAC,PN | Performed by: STUDENT IN AN ORGANIZED HEALTH CARE EDUCATION/TRAINING PROGRAM

## 2022-11-09 PROCEDURE — 99499 NO LOS: ICD-10-PCS | Mod: S$PBB,,, | Performed by: STUDENT IN AN ORGANIZED HEALTH CARE EDUCATION/TRAINING PROGRAM

## 2022-11-09 PROCEDURE — 88305 TISSUE EXAM BY PATHOLOGIST: CPT | Mod: 26,,, | Performed by: PATHOLOGY

## 2022-11-09 PROCEDURE — 99999 PR PBB SHADOW E&M-EST. PATIENT-LVL III: CPT | Mod: PBBFAC,,, | Performed by: STUDENT IN AN ORGANIZED HEALTH CARE EDUCATION/TRAINING PROGRAM

## 2022-11-09 PROCEDURE — 99499 UNLISTED E&M SERVICE: CPT | Mod: S$PBB,,, | Performed by: STUDENT IN AN ORGANIZED HEALTH CARE EDUCATION/TRAINING PROGRAM

## 2022-11-09 PROCEDURE — 3079F DIAST BP 80-89 MM HG: CPT | Mod: CPTII,,, | Performed by: STUDENT IN AN ORGANIZED HEALTH CARE EDUCATION/TRAINING PROGRAM

## 2022-11-09 PROCEDURE — 3074F SYST BP LT 130 MM HG: CPT | Mod: CPTII,,, | Performed by: STUDENT IN AN ORGANIZED HEALTH CARE EDUCATION/TRAINING PROGRAM

## 2022-11-09 PROCEDURE — 58100 ENDOMETRIAL BIOPSY: ICD-10-PCS | Mod: S$PBB,,, | Performed by: STUDENT IN AN ORGANIZED HEALTH CARE EDUCATION/TRAINING PROGRAM

## 2022-11-09 PROCEDURE — 1159F PR MEDICATION LIST DOCUMENTED IN MEDICAL RECORD: ICD-10-PCS | Mod: CPTII,,, | Performed by: STUDENT IN AN ORGANIZED HEALTH CARE EDUCATION/TRAINING PROGRAM

## 2022-11-09 PROCEDURE — 99213 OFFICE O/P EST LOW 20 MIN: CPT | Mod: PBBFAC,PN,25 | Performed by: STUDENT IN AN ORGANIZED HEALTH CARE EDUCATION/TRAINING PROGRAM

## 2022-11-09 PROCEDURE — 1159F MED LIST DOCD IN RCRD: CPT | Mod: CPTII,,, | Performed by: STUDENT IN AN ORGANIZED HEALTH CARE EDUCATION/TRAINING PROGRAM

## 2022-11-09 PROCEDURE — 81025 URINE PREGNANCY TEST: CPT | Mod: PBBFAC,PN | Performed by: STUDENT IN AN ORGANIZED HEALTH CARE EDUCATION/TRAINING PROGRAM

## 2022-11-09 PROCEDURE — 58300 INSERTION OF IUD: ICD-10-PCS | Mod: S$PBB,,, | Performed by: STUDENT IN AN ORGANIZED HEALTH CARE EDUCATION/TRAINING PROGRAM

## 2022-11-09 PROCEDURE — 88305 TISSUE EXAM BY PATHOLOGIST: CPT | Performed by: PATHOLOGY

## 2022-11-09 PROCEDURE — 3074F PR MOST RECENT SYSTOLIC BLOOD PRESSURE < 130 MM HG: ICD-10-PCS | Mod: CPTII,,, | Performed by: STUDENT IN AN ORGANIZED HEALTH CARE EDUCATION/TRAINING PROGRAM

## 2022-11-09 PROCEDURE — 3008F BODY MASS INDEX DOCD: CPT | Mod: CPTII,,, | Performed by: STUDENT IN AN ORGANIZED HEALTH CARE EDUCATION/TRAINING PROGRAM

## 2022-11-09 PROCEDURE — 3079F PR MOST RECENT DIASTOLIC BLOOD PRESSURE 80-89 MM HG: ICD-10-PCS | Mod: CPTII,,, | Performed by: STUDENT IN AN ORGANIZED HEALTH CARE EDUCATION/TRAINING PROGRAM

## 2022-11-09 PROCEDURE — 99999 PR PBB SHADOW E&M-EST. PATIENT-LVL III: ICD-10-PCS | Mod: PBBFAC,,, | Performed by: STUDENT IN AN ORGANIZED HEALTH CARE EDUCATION/TRAINING PROGRAM

## 2022-11-09 PROCEDURE — 58300 INSERT INTRAUTERINE DEVICE: CPT | Mod: PBBFAC,PN | Performed by: STUDENT IN AN ORGANIZED HEALTH CARE EDUCATION/TRAINING PROGRAM

## 2022-11-09 PROCEDURE — 88305 TISSUE EXAM BY PATHOLOGIST: ICD-10-PCS | Mod: 26,,, | Performed by: PATHOLOGY

## 2022-11-09 RX ADMIN — LEVONORGESTREL 1 INTRA UTERINE DEVICE: 52 INTRAUTERINE DEVICE INTRAUTERINE at 02:11

## 2022-11-09 NOTE — PROCEDURES
Endometrial biopsy    Date/Time: 11/9/2022 2:40 PM  Performed by: Gege Tello MD  Authorized by: Gege Tello MD     Consent:     Consent obtained:  Written    Consent given by:  Patient    Patient questions answered: yes      Patient agrees, verbalizes understanding, and wants to proceed: yes      Educational handouts given: yes      Instructions and paperwork completed: yes    Indication:     Indications: Menorrhagia and Other disorder of menstruation and other abnormal bleeding from female genital tract    Pre-procedure:     Pre-procedure timeout performed: yes    Procedure:     Cervix cleaned and prepped: yes      Uterus sounded: yes      Uterus sound depth (cm):  8    Specimen collected: specimen collected and sent to pathology      Patient tolerated procedure well with no complications: yes    Insertion of IUD    Date/Time: 11/9/2022 2:40 PM  Performed by: Gege Tello MD  Authorized by: Gege Tello MD     Consent:     Consent obtained:  Written    Consent given by:  Patient    Procedure risks and benefits discussed: yes      Patient questions answered: yes      Patient agrees, verbalizes understanding, and wants to proceed: yes      Educational handouts given: yes      Instructions and paperwork completed: yes    Procedure:     Pelvic exam performed: yes      Negative urine pregnancy test: yes      Cervix cleaned and prepped: yes      Speculum placed in vagina: yes      Tenaculum applied to cervix: yes      Uterus sounded: yes      Uterus sound depth (cm):  8    IUD inserted with no complications: yes      Strings trimmed: yes    1 Intra Uterine Device levonorgestreL 20 mcg/24 hours (8 yrs) 52 mg     Post-procedure:     Patient tolerated procedure well: yes      Patient will follow up after next period: yes

## 2022-11-15 LAB
FINAL PATHOLOGIC DIAGNOSIS: NORMAL
GROSS: NORMAL
Lab: NORMAL